# Patient Record
Sex: FEMALE | Race: WHITE
[De-identification: names, ages, dates, MRNs, and addresses within clinical notes are randomized per-mention and may not be internally consistent; named-entity substitution may affect disease eponyms.]

---

## 2018-11-17 ENCOUNTER — HOSPITAL ENCOUNTER (OUTPATIENT)
Dept: HOSPITAL 47 - RADPETMAIN | Age: 73
Discharge: HOME | End: 2018-11-17
Attending: NURSE PRACTITIONER
Payer: MEDICARE

## 2018-11-17 DIAGNOSIS — C43.59: Primary | ICD-10-CM

## 2018-11-17 DIAGNOSIS — Z98.890: ICD-10-CM

## 2018-11-17 PROCEDURE — 78816 PET IMAGE W/CT FULL BODY: CPT

## 2018-11-19 NOTE — PE
Nuclear medicine PET/CT

 

HISTORY: Melanoma, initial

 

Patient received 13.1 mCi F-18 FDG intravenously in delayed scanning was performed through the whole 
body.

 

No comparisons

 

Neck and Chest: There is no suspicious hypermetabolic uptake. No evident adenopathy. No evident lung 
mass. No pleural or pericardial effusion.

 

ABDOMEN: No evident liver mass or retroperitoneal adenopathy. Prominent extrarenal pelvis noted in th
e right kidney, there may be UPJ obstruction. No suspicious hypermetabolic uptake. Uterus and adnexal
 structures are not seen.

 

Osseous structures are are unremarkable, no suspicious hypermetabolic uptake. Sclerotic focus in the 
left sacral ala likely represents bone island. Hypertrophic change present at the sacroiliac joints, 
facet joints of the lower lumbar spine.

 

Lower extremities are remarkable for knee prostheses, no suspicious hypermetabolic uptake to suggest 
metastatic disease.

 

IMPRESSION: No suspicious hypermetabolic uptake is evident to suggest metastatic disease. Postop ocampo
ges.

## 2019-04-12 ENCOUNTER — HOSPITAL ENCOUNTER (OUTPATIENT)
Dept: HOSPITAL 47 - RADPETMAIN | Age: 74
End: 2019-04-12
Attending: INTERNAL MEDICINE
Payer: MEDICARE

## 2019-04-12 DIAGNOSIS — C43.59: Primary | ICD-10-CM

## 2019-04-12 PROCEDURE — 78816 PET IMAGE W/CT FULL BODY: CPT

## 2019-04-15 NOTE — PE
EXAMINATION TYPE: PET CT fusion whole body

 

DATE OF EXAM: 4/12/2019

 

COMPARISON: Prior PET/CT November 17, 2018

 

HISTORY: Melanoma with masses removed from arms and back, 4 prior surgeries most recent April 1, 2019
.   

 

TECHNIQUE:  Following the intravenous administration of 15.2 x 4 mCi of F-18 FDG, whole body images a
re performed from the top of skull to the bottom of feet.  Images are reviewed on the computer in the
 coronal, axial, and sagittal planes.  Reconstructed rotating images are created on independent works
tation and reviewed on the computer.   A noncontrast CT is performed in conjunction with the PET scan
. PET/CT imaging of bilateral lower extremities also performed.

 

SCAN: Subsequent Scan

 

FINDINGS: 

 

HEAD AND NECK:  No suspicious hypermetabolic uptake or masses in the scalp. No suspicious hypermetabo
lic masses or adenopathy in the neck.

 

CHEST, MEDIASTINUM, AND HILAR REGION: In the posterior subcutaneous fat lateral to the right scapula 
tip there is new 9 mm hypermetabolic nodule axial image 120, max SUV is 3.71. There is additional new
 suspicious soft tissue nodule posterior right upper thorax axial image 90 measuring 11 x 6 mm that i
s ametabolic.

 

ABDOMEN AND PELVIS: Normal excretion and collecting systems and bladder is present. No suspicious new
 hypermetabolic adenopathy or soft tissue nodules are identified.

 

OSSEOUS STRUCTURES: No new areas of abnormal hypermetabolic uptake.

 

LOWER EXTREMITIES: Metallic artifact bilateral knee arthroplasty is redemonstrated with areas of asym
metric metallic uptake surrounding left knee prosthesis similar to prior. There is linear uptake invo
lving anterior lateral left leg muscle on current study presumed postinflammatory. No suspicious hype
rmetabolic subcutaneous nodules are identified.

 

OTHER CT: Mild cardiomegaly with coronary artery calcification is redemonstrated. There is new right 
internal jugular Mediport catheter terminating in SVC.

 

There is suspected 1.1 cm rim calcified splenic artery aneurysm axial image 149 redemonstrated. Small
 hiatal hernia is again seen.

 

Mild/moderate calcified plaque of aorta extends into branch vessels. There is persistent moderate tanisha
localiectasis without hydroureter suggesting right UPJ stricture or stenosis. Scattered left-sided pe
lvic phleboliths are redemonstrated. Uterus is surgically absent or markedly atrophic.

 

There is multilevel spurring in the spine. There is facet arthropathy in lower lumbar levels.

 

IMPRESSION: Single new hypermetabolic lesion identified lateral aspect right mid thorax. Also suspici
ous ametabolic new subcutaneous lesion posterior right upper thorax.

## 2019-08-17 ENCOUNTER — HOSPITAL ENCOUNTER (OUTPATIENT)
Dept: HOSPITAL 47 - RADPETMAIN | Age: 74
Discharge: HOME | End: 2019-08-17
Attending: INTERNAL MEDICINE
Payer: MEDICARE

## 2019-08-17 DIAGNOSIS — C43.59: Primary | ICD-10-CM

## 2019-08-17 PROCEDURE — 78816 PET IMAGE W/CT FULL BODY: CPT

## 2019-08-19 NOTE — PE
Nuclear medicine PET/CT

 

HISTORY: Malignant melanoma of other part trauma, C 43.59, subsequent

 

Patient received 10 mCi F-18 FDG intravenously and delayed whole-body scanning was performed.

 

Correlation to prior PET/CT 4/12/2019

 

Head and neck: Unremarkable, stable, no evident adenopathy.

 

CHEST: The Port-A-Cath in the right pectoral region shows a loop in the internal jugular vein, distal
 tip is likely near the confluence with the subclavian vein. There is a soft tissue mass within the s
ubcutaneous soft tissues at the level of the third posterior rib measuring approximately 25 mm and th
ere is associated hypermetabolic uptake, SUV is 6.8. This has progressed in size and activity in the 
interval compared to prior exam. The lesion lateral to the scapular tip as noted on prior exam is not
ed as on prior exam and is somewhat less well-defined, may be increased in size slightly measuring ap
proximately 1.8 cm as compared to prior when it measured 9 mm, SUV is 3.1. There is an additional foc
us immediately adjacent extends towards the skin of the back without definite associated hypermetabol
ic uptake. There is a lung nodule within the lingula measuring approximately 1 cm in AP dimension, pr
evious exam lesion measured only 4 mm at this level. There is an additional lung nodule present in th
e right lung on axial image #116 measuring approximately 9 mm, no associated hypermetabolic uptake. L
eft upper lobe lung nodule measuring 1 cm shows no associated hypermetabolic uptake and was not seen 
on prior exam. Similarly, left upper lobe lung nodule on axial image 107 measures 13 mm and was not s
een on previous as well as a pleural-based nodule on axial image 111, new left upper lobe lung nodule
 axial image 85 measures 6 mm at the apex, right upper lobe nodule medially measures 6 mm on axial an
d 97, additional subpleural nodule at the level of the mediastinum is new and measures 7 to 8 mm. Rig
ht lower lobe nodule axial image 109 is subcentimeter in size,, right lower lobe nodule on axial imag
e 124 measures 9 mm, there may be additional smaller nodules lung nodules do not show hypermetabolic 
uptake.

 

There is some uptake associated with the patient's thyroid gland on the left similar to prior exam, S
UV is.

 

ABDOMEN: There is a focus of decreased uptake present within the posterior spleen with some periphera
l hypermetabolic uptake which was not seen on prior exam. Lesion measures approximately 4.3 cm in siz
e, SUV 3.5. Questionable focus of liver uptake inferiorly adjacent to the gallbladder on axial image 
168, SUV 3.4.

 

Lower extremities are stable in appearance.

 

Osseous structures are stable, no suspicious hypermetabolic uptake.

 

IMPRESSION: Progression of patient's metastatic disease is suspected. Indeterminate abnormal focus wi
thin the spleen as described. Additional findings above.

## 2019-11-04 ENCOUNTER — HOSPITAL ENCOUNTER (INPATIENT)
Dept: HOSPITAL 47 - EC | Age: 74
LOS: 3 days | Discharge: HOME HEALTH | DRG: 300 | End: 2019-11-07
Attending: HOSPITALIST | Admitting: HOSPITALIST
Payer: MEDICARE

## 2019-11-04 DIAGNOSIS — C79.71: ICD-10-CM

## 2019-11-04 DIAGNOSIS — C78.89: ICD-10-CM

## 2019-11-04 DIAGNOSIS — Z79.1: ICD-10-CM

## 2019-11-04 DIAGNOSIS — Z79.899: ICD-10-CM

## 2019-11-04 DIAGNOSIS — Z88.0: ICD-10-CM

## 2019-11-04 DIAGNOSIS — C78.01: ICD-10-CM

## 2019-11-04 DIAGNOSIS — D69.59: ICD-10-CM

## 2019-11-04 DIAGNOSIS — Z90.710: ICD-10-CM

## 2019-11-04 DIAGNOSIS — R06.89: ICD-10-CM

## 2019-11-04 DIAGNOSIS — C78.7: ICD-10-CM

## 2019-11-04 DIAGNOSIS — I82.4Z2: ICD-10-CM

## 2019-11-04 DIAGNOSIS — Z79.52: ICD-10-CM

## 2019-11-04 DIAGNOSIS — E78.5: ICD-10-CM

## 2019-11-04 DIAGNOSIS — D72.829: ICD-10-CM

## 2019-11-04 DIAGNOSIS — C43.9: ICD-10-CM

## 2019-11-04 DIAGNOSIS — I10: ICD-10-CM

## 2019-11-04 DIAGNOSIS — C78.02: ICD-10-CM

## 2019-11-04 DIAGNOSIS — C79.72: ICD-10-CM

## 2019-11-04 DIAGNOSIS — Z88.1: ICD-10-CM

## 2019-11-04 DIAGNOSIS — J70.4: ICD-10-CM

## 2019-11-04 DIAGNOSIS — T45.1X5A: ICD-10-CM

## 2019-11-04 DIAGNOSIS — I82.432: Primary | ICD-10-CM

## 2019-11-04 DIAGNOSIS — K59.00: ICD-10-CM

## 2019-11-04 LAB
ALBUMIN SERPL-MCNC: 3.4 G/DL (ref 3.5–5)
ALP SERPL-CCNC: 162 U/L (ref 38–126)
ALT SERPL-CCNC: 133 U/L (ref 9–52)
ANION GAP SERPL CALC-SCNC: 9 MMOL/L
APTT BLD: 84.3 SEC (ref 22–30)
AST SERPL-CCNC: 275 U/L (ref 14–36)
BASOPHILS # BLD AUTO: 0 K/UL (ref 0–0.2)
BASOPHILS NFR BLD AUTO: 0 %
BUN SERPL-SCNC: 37 MG/DL (ref 7–17)
CALCIUM SPEC-MCNC: 8.8 MG/DL (ref 8.4–10.2)
CHLORIDE SERPL-SCNC: 105 MMOL/L (ref 98–107)
CO2 SERPL-SCNC: 22 MMOL/L (ref 22–30)
EOSINOPHIL # BLD AUTO: 0 K/UL (ref 0–0.7)
EOSINOPHIL NFR BLD AUTO: 0 %
ERYTHROCYTE [DISTWIDTH] IN BLOOD BY AUTOMATED COUNT: 5.56 M/UL (ref 3.8–5.4)
ERYTHROCYTE [DISTWIDTH] IN BLOOD: 13.5 % (ref 11.5–15.5)
GLUCOSE SERPL-MCNC: 102 MG/DL (ref 74–99)
HCT VFR BLD AUTO: 48.5 % (ref 34–46)
HGB BLD-MCNC: 15.8 GM/DL (ref 11.4–16)
INR PPP: 1 (ref ?–1.2)
LYMPHOCYTES # SPEC AUTO: 5.6 K/UL (ref 1–4.8)
LYMPHOCYTES NFR SPEC AUTO: 38 %
MCH RBC QN AUTO: 28.4 PG (ref 25–35)
MCHC RBC AUTO-ENTMCNC: 32.6 G/DL (ref 31–37)
MCV RBC AUTO: 87.2 FL (ref 80–100)
MONOCYTES # BLD AUTO: 0.7 K/UL (ref 0–1)
MONOCYTES NFR BLD AUTO: 4 %
NEUTROPHILS # BLD AUTO: 8.4 K/UL (ref 1.3–7.7)
NEUTROPHILS NFR BLD AUTO: 56 %
PLATELET # BLD AUTO: 103 K/UL (ref 150–450)
POTASSIUM SERPL-SCNC: 4.6 MMOL/L (ref 3.5–5.1)
PROT SERPL-MCNC: 6.6 G/DL (ref 6.3–8.2)
PT BLD: 11.1 SEC (ref 9–12)
SODIUM SERPL-SCNC: 136 MMOL/L (ref 137–145)
WBC # BLD AUTO: 14.9 K/UL (ref 3.8–10.6)

## 2019-11-04 PROCEDURE — 83880 ASSAY OF NATRIURETIC PEPTIDE: CPT

## 2019-11-04 PROCEDURE — 87040 BLOOD CULTURE FOR BACTERIA: CPT

## 2019-11-04 PROCEDURE — 87502 INFLUENZA DNA AMP PROBE: CPT

## 2019-11-04 PROCEDURE — 71275 CT ANGIOGRAPHY CHEST: CPT

## 2019-11-04 PROCEDURE — 84484 ASSAY OF TROPONIN QUANT: CPT

## 2019-11-04 PROCEDURE — 96374 THER/PROPH/DIAG INJ IV PUSH: CPT

## 2019-11-04 PROCEDURE — 94640 AIRWAY INHALATION TREATMENT: CPT

## 2019-11-04 PROCEDURE — 80053 COMPREHEN METABOLIC PANEL: CPT

## 2019-11-04 PROCEDURE — 93005 ELECTROCARDIOGRAM TRACING: CPT

## 2019-11-04 PROCEDURE — 99285 EMERGENCY DEPT VISIT HI MDM: CPT

## 2019-11-04 PROCEDURE — 36415 COLL VENOUS BLD VENIPUNCTURE: CPT

## 2019-11-04 PROCEDURE — 85610 PROTHROMBIN TIME: CPT

## 2019-11-04 PROCEDURE — 85025 COMPLETE CBC W/AUTO DIFF WBC: CPT

## 2019-11-04 PROCEDURE — 96375 TX/PRO/DX INJ NEW DRUG ADDON: CPT

## 2019-11-04 PROCEDURE — 93306 TTE W/DOPPLER COMPLETE: CPT

## 2019-11-04 PROCEDURE — 85730 THROMBOPLASTIN TIME PARTIAL: CPT

## 2019-11-04 RX ADMIN — Medication SCH MG: at 22:44

## 2019-11-04 RX ADMIN — ENOXAPARIN SODIUM SCH MG: 80 INJECTION SUBCUTANEOUS at 22:45

## 2019-11-04 NOTE — P.HPIM
History of Present Illness


H&P Date: 11/04/19





Patient is a 74-year-old female with a PMH of metastatic melanoma diagnosed ~18 

months ago s/p Opdivo for 11 months, currently on Mekinist and Tefinlar for the 

past 7 days, sent to the ED form Dr Arellano's office for SOB. The patient had 

presented to his office earlier today for her routine appointment where Mirta 

noted that the patient appeared more SOB. Patient endorsed to her that she has 

been getting tired and short of breath quickly since she began her new 

chemotherapy regimen. There was also concerns regarding the swelling and warmth 

of her LLE and the patient was subsequently sent to the ED. Upon arrival, the 

patient's vital signs were P 102, /93, T 98.1, and SpO2 96% on RA. The p

atient reported that along with the decreased exercise tolerance with shortness 

of breath, she has also had a persistent non-productive cough for the past few 

weeks to months. She also reports a single episode of fever at home w/ Tmax 101,

two days ago. She also notes occasional nausea with dry heaving. The patient 

reports no changes in her legs and states that since her knee surgeries, she has

always had some LE swelling and hadn't noticed anything unusual. She denied leg 

pain, recent travel. She also denied chest pain, dizziness, palpitations. She 

denied vomiting, diarrhea, or abdominal pain. She underwent an extensive 

evaluation in the ED w/ venous doppler of the LLE positive for DVT from 

popliteal vein to the proximal calf veins. Chest CTA revealed extensive 

progressive metastatic disease involving the lungs, spleen, and liver without 

any evidence of PE. EKG revealed sinus tachycardia @ 105 bpm w/ TWI and q-wave 

in lead III. Laboratory evaluation revealed WBC count of 14.9, platelets of 103,

, , alk phos 162, sodium 136, potassium 4.6, BUN 37, and 

creatinine 0.68.  Patient was admitted for further management.





Review of Systems





Pertinent positives and negatives as discussed in HPI, a complete review of 

systems was performed and all other systems are negative.





Past Medical History


Past Medical History: Cancer, Hyperlipidemia, Hypertension, Pneumonia


History of Any Multi-Drug Resistant Organisms: None Reported


Past Surgical History: Bladder Surgery, Hernia Repair, Hysterectomy, Orthopedic 

Surgery


Additional Past Surgical History / Comment(s): tumor removal ight breast


Past Psychological History: No Psychological Hx Reported


Smoking Status: Never smoker


Past Alcohol Use History: None Reported


Past Drug Use History: None Reported





Medications and Allergies


                                Home Medications











 Medication  Instructions  Recorded  Confirmed  Type


 


Meloxicam [Mobic] 7.5 mg PO BID 11/04/19 11/04/19 History


 


Nortriptyline [Pamelor] 10 mg PO DAILY 11/04/19 11/04/19 History


 


Nystatin-Triamcinolone Oint 1 applic TOPICAL BID PRN 11/04/19 11/04/19 History





[Mycolog 100,000-0.1 Unit/gm-%    





Oint]    


 


Omeprazole 20 mg PO DAILY 11/04/19 11/04/19 History


 


Prochlorperazine [Compazine] 10 mg PO Q6H PRN 11/04/19 11/04/19 History


 


Sertraline [Zoloft] 100 mg PO BID 11/04/19 11/04/19 History


 


Tafinlar 75mg Capsule 150 mg PO Q12H 11/04/19 11/04/19 History


 


Trametinib Dimethyl Sulfoxide 2 mg PO DAILY 11/04/19 11/04/19 History





[Mekinist]    


 


amLODIPine BESYLATE [Norvasc] 2.5 mg PO DAILY 11/04/19 11/04/19 History








                                    Allergies











Allergy/AdvReac Type Severity Reaction Status Date / Time


 


ciprofloxacin [From Cipro] Allergy  Hallucinati Verified 11/04/19 20:32





   ons  


 


Penicillins Allergy  Unknown Verified 11/04/19 20:32





   Childhood  














Physical Exam


Vitals: 


                                   Vital Signs











  Temp Pulse Resp BP Pulse Ox


 


 11/04/19 20:00    30 H  131/93  92 L


 


 11/04/19 19:30   109 H  23  118/84  95


 


 11/04/19 19:23   100  18  


 


 11/04/19 19:20   105 H  14   99


 


 11/04/19 19:15   101 H  16  


 


 11/04/19 17:50  98.1 F  102 H  19  145/93  96








                                Intake and Output











 11/04/19 11/04/19 11/04/19





 06:59 14:59 22:59


 


Other:   


 


  Weight   75.296 kg














General: non toxic, no distress, appears at stated age


Derm: no unusual rashes/lesions no unusual ecchymoses, warm, dry


Head: atraumatic, normocephalic, symmetric


Eyes: EOMI, no lid lag, anicteric sclera, pupils equal round reactive to light


ENT: Nose and ears atraumatic, no thrush,  no pharyngeal erythema


Neck: No thyromegaly, no cervical lymphadenopathy, trachea midline, supple


Mouth: no lip lesion, mucus membranes moist


Cardiovascular: S1S2 reg, tachycardic, no murmur, positive posterior tibial 

pulse bilateral, capillary refill less than 2 seconds


Lungs: CTA bilateral, no rhonchi, no rales , no accessory muscle use


Abdominal: soft,  nontender to palpation, no guarding, no appreciable 

organomegaly, normal bowel sounds


Ext: Left lower extremity mild swelling and warmth, no gross muscle atrophy,  

muscle strength 5 out of 5 in all 4 extremities grossly, no contractures


Neuro:  CN II-XI grossly intact, light touch intact all 4 extremities, finger to

nose within normal limits,


Psych: Alert, oriented, appropriate affect 





Results


CBC & Chem 7: 


                                 11/04/19 18:09





                                 11/04/19 18:09


Labs: 


                  Abnormal Lab Results - Last 24 Hours (Table)











  11/04/19 11/04/19 11/04/19 Range/Units





  18:09 18:09 18:09 


 


WBC  14.9 H    (3.8-10.6)  k/uL


 


RBC  5.56 H    (3.80-5.40)  m/uL


 


Hct  48.5 H    (34.0-46.0)  %


 


Plt Count  103 L    (150-450)  k/uL


 


Neutrophils #  8.4 H    (1.3-7.7)  k/uL


 


Lymphocytes #  5.6 H    (1.0-4.8)  k/uL


 


APTT    84.3 H  (22.0-30.0)  sec


 


Sodium   136 L   (137-145)  mmol/L


 


BUN   37 H   (7-17)  mg/dL


 


Glucose   102 H   (74-99)  mg/dL


 


AST   275 H   (14-36)  U/L


 


ALT   133 H   (9-52)  U/L


 


Alkaline Phosphatase   162 H   ()  U/L


 


Albumin   3.4 L   (3.5-5.0)  g/dL














Assessment and Plan


Plan: 





Acute LLE DVT in setting of malignancy


-Start patient on Lovenox


-Heme/Onc consulted


-Monitor CBC





Shortness of breath, possibly due to extensive metastatic melanoma disease 

burden


-Dr Arellano consulted


-Monitor for now


-Supplemental oxygen


-Echocardiogram ordered





Thrombocytopenia


-Likely due to chemotherapy


-Monitor for now





Leukocytosis


-Likely due to underlying malignancy


-Monitor for signs of infection





Abnormal LFTs


-Possibly secondary to metastatic disease of the liver


-Monitor for now





Chronic conditions: HTN, HLD


-C/w home meds





DVT prophylaxis


-Lovenox





The patient is admitted with an anticipated greater than 2 midnight stay for 

evaluation of acute DVT


CODE STATUS: Full Code


Discussed with: Patient


Anticipated discharge date: 2-3 days


Anticipated discharge place: Home


A total of 40 minutes was spent on the care of this complex patient more than 

50% of the time was spent in counseling and care coordination.

## 2019-11-04 NOTE — ED
SOB HPI





- General


Chief Complaint: Shortness of Breath


Stated Complaint: blood clot


Time Seen by Provider: 11/04/19 17:56


Source: patient


Mode of arrival: wheelchair


Limitations: no limitations





- History of Present Illness


Initial Comments: 





The patient is a 74-year-old female with past history of metastatic melanoma who

presents to the emergency department with reported shortness of breath.  She 

states it's been going on for the past 6 days.  Reports to mild sputum 

production and low-grade fevers.  She did see Dr. Arellano's NP Mirta in office 

today at 1:30 pm.  She did send the patient for a lower extremity Doppler and a 

CT of her chest.  Patient had imaging performed and was called with results.  

She was told to proceed immediately to the emergency room.  She was told that 

she does have a lower extremity DVT.  No history of similar in the past.  Denies

calf pain or swelling.  Does admit to chest palpitations.  She is not currently 

on anticoagulation.  She is on oral chemotherapy daily at home.  Denies any 

chest pain.  No history of cardiac disease.  Denies any headaches or visual 

changes.  No known metastatic disease to the brain.  No ripping or tearing 

sensation to her back.  Does admit to chronic nausea and some vomiting.  Denies 

any changes in her bowel or bladder habits.  No history of life-threatening 

bleed.  There are no other alleviating, precipitating or modifying factors





- Related Data


                                Home Medications











 Medication  Instructions  Recorded  Confirmed


 


Meloxicam [Mobic] 7.5 mg PO BID 11/04/19 11/04/19


 


Nortriptyline [Pamelor] 10 mg PO HS 11/04/19 11/04/19


 


Nystatin-Triamcinolone Oint 1 applic TOPICAL BID PRN 11/04/19 11/04/19





[Mycolog 100,000-0.1 Unit/gm-%   





Oint]   


 


Omeprazole 20 mg PO DAILY 11/04/19 11/04/19


 


Prochlorperazine [Compazine] 10 mg PO Q6H PRN 11/04/19 11/04/19


 


Sertraline [Zoloft] 100 mg PO BID 11/04/19 11/04/19


 


Tafinlar 75mg Capsule 150 mg PO Q12H 11/04/19 11/04/19


 


Trametinib Dimethyl Sulfoxide 2 mg PO DAILY 11/04/19 11/04/19





[Mekinist]   


 


amLODIPine BESYLATE [Norvasc] 2.5 mg PO DAILY 11/04/19 11/04/19








                                  Previous Rx's











 Medication  Instructions  Recorded


 


Enoxaparin [Lovenox] 75 mg SQ Q12H 30 Days #60 syringe 11/07/19


 


HYDROcodone/APAP 5-325MG [Norco 1 each PO Q4HR PRN 3 Days #12 tab 11/07/19





5-325]  


 


Sennosides [Senokot] 8.6 mg PO BID 30 Days #60 tab 11/07/19


 


predniSONE 40 mg PO AS DIRECTED 15 Days #30 11/07/19





 tab 











                                    Allergies











Allergy/AdvReac Type Severity Reaction Status Date / Time


 


ciprofloxacin [From Cipro] Allergy  Hallucinati Verified 11/04/19 20:32





   ons  


 


Penicillins Allergy  Unknown Verified 11/04/19 20:32





   Childhood  














Review of Systems


ROS Statement: 


Those systems with pertinent positive or pertinent negative responses have been 

documented in the HPI.





ROS Other: All systems not noted in ROS Statement are negative.





Past Medical History


Past Medical History: Cancer, Hyperlipidemia, Hypertension, Pneumonia


History of Any Multi-Drug Resistant Organisms: None Reported


Past Surgical History: Bladder Surgery, Hernia Repair, Hysterectomy, Orthopedic 

Surgery


Additional Past Surgical History / Comment(s): tumor removal ight breast


Past Psychological History: No Psychological Hx Reported


Smoking Status: Never smoker


Past Alcohol Use History: None Reported


Past Drug Use History: None Reported





General Exam


Limitations: no limitations


General appearance: alert, in no apparent distress


Head exam: Present: atraumatic, normocephalic


Eye exam: Present: PERRL, EOMI


Pupils: Present: normal accommodation


ENT exam: Present: normal exam, normal oropharynx


Neck exam: Absent: tenderness, meningismus


Respiratory exam: Present: accessory muscle use, other (Conversational dyspnea, 

tachypnea).  Absent: wheezes, rales, rhonchi, stridor


Cardiovascular Exam: Present: normal rhythm, tachycardia


GI/Abdominal exam: Present: soft.  Absent: distended, tenderness, guarding, 

rebound, rigid


Extremities exam: Present: normal capillary refill, pedal edema, calf tenderness


Neurological exam: Present: alert, oriented X3


Psychiatric exam: Present: normal affect, normal mood


Skin exam: Present: warm, dry, intact





Course


                                   Vital Signs











  11/04/19 11/04/19 11/04/19





  17:50 19:15 19:20


 


Temperature 98.1 F  


 


Pulse Rate 102 H 101 H 105 H


 


Pulse Rate [   





Pulse Oximetery   





]   


 


Respiratory 19 16 14





Rate   


 


Blood Pressure 145/93  


 


Blood Pressure   





[Right Arm]   


 


O2 Sat by Pulse 96  99





Oximetry   














  11/04/19 11/04/19 11/04/19





  19:23 19:30 20:00


 


Temperature   


 


Pulse Rate 100 109 H 


 


Pulse Rate [   





Pulse Oximetery   





]   


 


Respiratory 18 23 30 H





Rate   


 


Blood Pressure  118/84 131/93


 


Blood Pressure   





[Right Arm]   


 


O2 Sat by Pulse  95 92 L





Oximetry   














  11/04/19





  21:30


 


Temperature 97.8 F


 


Pulse Rate 


 


Pulse Rate [ 104 H





Pulse Oximetery 





] 


 


Respiratory 18





Rate 


 


Blood Pressure 


 


Blood Pressure 141/87





[Right Arm] 


 


O2 Sat by Pulse 97





Oximetry 














Medical Decision Making





- Medical Decision Making


Upon arrival the patient was placed into room 5.  A thorough history and 

physical exam was performed.  I did perform a 12-lead EKG which demonstrated a 

sinus tachycardia.  I reviewed the patient's imaging reports.  Venous Doppler 

demonstrates positive DVT in the left leg from the popliteal vein to proximal 

calf veins.  CTA of the chest demonstrates interval development of bilateral 

consolidations and tiny left effusion with subtle groundglass changes.  No diagn

ostic evidence of pulmonary embolism.  Marked interval progression in the size 

number of pulmonary nodules noted diffusely within bilateral lung fields.  

Splenomegaly with increase in size of largest lesion measuring 5.8 cm.  

Subcutaneous and hepatic metastasis.  I did recommend laboratory studies.  White

blood cell count is 14.9.  Hemoglobin 15.8.  Platelets 103.  PTT is originally 

measured at 84.3.  Repeat is 23.8.  Blood work was drawn off of the patient's 

port.  INR is 1.  Elevation in her AST of 275, .  Troponin is negative.  

Influenza A and B are negative I called and discussed this with the patient's 

oncologist, Dr. Arellano.  He requested that the patient be heparinized.  She does 

not have any contraindications therefore do order a heparin bolus and drip.  He 

instructed me to hold off on the patient's oral chemotherapy.  He wants the 

patient placed on IV steroids and covered with IV antibiotics.  He is given 125 

mg of Solu-Medrol, 1 g of Rocephin and 500 mg of azithromycin.  Dr. Arellano refused 

hospital admission and requested to be placed on consult.  I called and 

discussed this with Dr. Mac who accepted admission.  Bridging orders were 

placed and the patient went to the floor in stable condition





- Lab Data


Result diagrams: 


                                 11/07/19 05:30





                                 11/05/19 05:34


                                   Lab Results











  11/04/19 11/04/19 11/04/19 Range/Units





  18:09 18:09 18:09 


 


WBC  14.9 H    (3.8-10.6)  k/uL


 


RBC  5.56 H    (3.80-5.40)  m/uL


 


Hgb  15.8    (11.4-16.0)  gm/dL


 


Hct  48.5 H    (34.0-46.0)  %


 


MCV  87.2    (80.0-100.0)  fL


 


MCH  28.4    (25.0-35.0)  pg


 


MCHC  32.6    (31.0-37.0)  g/dL


 


RDW  13.5    (11.5-15.5)  %


 


Plt Count  103 L    (150-450)  k/uL


 


Neutrophils %  56    %


 


Lymphocytes %  38    %


 


Monocytes %  4    %


 


Eosinophils %  0    %


 


Basophils %  0    %


 


Neutrophils #  8.4 H    (1.3-7.7)  k/uL


 


Lymphocytes #  5.6 H    (1.0-4.8)  k/uL


 


Monocytes #  0.7    (0-1.0)  k/uL


 


Eosinophils #  0.0    (0-0.7)  k/uL


 


Basophils #  0.0    (0-0.2)  k/uL


 


PT     (9.0-12.0)  sec


 


INR     (<1.2)  


 


APTT     (22.0-30.0)  sec


 


Sodium   136 L   (137-145)  mmol/L


 


Potassium   4.6   (3.5-5.1)  mmol/L


 


Chloride   105   ()  mmol/L


 


Carbon Dioxide   22   (22-30)  mmol/L


 


Anion Gap   9   mmol/L


 


BUN   37 H   (7-17)  mg/dL


 


Creatinine   0.68   (0.52-1.04)  mg/dL


 


Est GFR (CKD-EPI)AfAm   >90   (>60 ml/min/1.73 sqM)  


 


Est GFR (CKD-EPI)NonAf   88   (>60 ml/min/1.73 sqM)  


 


Glucose   102 H   (74-99)  mg/dL


 


Calcium   8.8   (8.4-10.2)  mg/dL


 


Total Bilirubin   0.8   (0.2-1.3)  mg/dL


 


AST   275 H   (14-36)  U/L


 


ALT   133 H   (9-52)  U/L


 


Alkaline Phosphatase   162 H   ()  U/L


 


Troponin I     (0.000-0.034)  ng/mL


 


NT-Pro-B Natriuret Pep    139  pg/mL


 


Total Protein   6.6   (6.3-8.2)  g/dL


 


Albumin   3.4 L   (3.5-5.0)  g/dL


 


Influenza Type A RNA     (Not Detectd)  


 


Influenza Type B (PCR)     (Not Detectd)  














  11/04/19 11/04/19 11/04/19 Range/Units





  18:09 18:09 19:45 


 


WBC     (3.8-10.6)  k/uL


 


RBC     (3.80-5.40)  m/uL


 


Hgb     (11.4-16.0)  gm/dL


 


Hct     (34.0-46.0)  %


 


MCV     (80.0-100.0)  fL


 


MCH     (25.0-35.0)  pg


 


MCHC     (31.0-37.0)  g/dL


 


RDW     (11.5-15.5)  %


 


Plt Count     (150-450)  k/uL


 


Neutrophils %     %


 


Lymphocytes %     %


 


Monocytes %     %


 


Eosinophils %     %


 


Basophils %     %


 


Neutrophils #     (1.3-7.7)  k/uL


 


Lymphocytes #     (1.0-4.8)  k/uL


 


Monocytes #     (0-1.0)  k/uL


 


Eosinophils #     (0-0.7)  k/uL


 


Basophils #     (0-0.2)  k/uL


 


PT  11.1    (9.0-12.0)  sec


 


INR  1.0    (<1.2)  


 


APTT  84.3 H    (22.0-30.0)  sec


 


Sodium     (137-145)  mmol/L


 


Potassium     (3.5-5.1)  mmol/L


 


Chloride     ()  mmol/L


 


Carbon Dioxide     (22-30)  mmol/L


 


Anion Gap     mmol/L


 


BUN     (7-17)  mg/dL


 


Creatinine     (0.52-1.04)  mg/dL


 


Est GFR (CKD-EPI)AfAm     (>60 ml/min/1.73 sqM)  


 


Est GFR (CKD-EPI)NonAf     (>60 ml/min/1.73 sqM)  


 


Glucose     (74-99)  mg/dL


 


Calcium     (8.4-10.2)  mg/dL


 


Total Bilirubin     (0.2-1.3)  mg/dL


 


AST     (14-36)  U/L


 


ALT     (9-52)  U/L


 


Alkaline Phosphatase     ()  U/L


 


Troponin I   <0.012   (0.000-0.034)  ng/mL


 


NT-Pro-B Natriuret Pep     pg/mL


 


Total Protein     (6.3-8.2)  g/dL


 


Albumin     (3.5-5.0)  g/dL


 


Influenza Type A RNA    Not Detected  (Not Detectd)  


 


Influenza Type B (PCR)    Not Detected  (Not Detectd)  














  11/04/19 Range/Units





  19:45 


 


WBC   (3.8-10.6)  k/uL


 


RBC   (3.80-5.40)  m/uL


 


Hgb   (11.4-16.0)  gm/dL


 


Hct   (34.0-46.0)  %


 


MCV   (80.0-100.0)  fL


 


MCH   (25.0-35.0)  pg


 


MCHC   (31.0-37.0)  g/dL


 


RDW   (11.5-15.5)  %


 


Plt Count   (150-450)  k/uL


 


Neutrophils %   %


 


Lymphocytes %   %


 


Monocytes %   %


 


Eosinophils %   %


 


Basophils %   %


 


Neutrophils #   (1.3-7.7)  k/uL


 


Lymphocytes #   (1.0-4.8)  k/uL


 


Monocytes #   (0-1.0)  k/uL


 


Eosinophils #   (0-0.7)  k/uL


 


Basophils #   (0-0.2)  k/uL


 


PT   (9.0-12.0)  sec


 


INR   (<1.2)  


 


APTT  23.8  (22.0-30.0)  sec


 


Sodium   (137-145)  mmol/L


 


Potassium   (3.5-5.1)  mmol/L


 


Chloride   ()  mmol/L


 


Carbon Dioxide   (22-30)  mmol/L


 


Anion Gap   mmol/L


 


BUN   (7-17)  mg/dL


 


Creatinine   (0.52-1.04)  mg/dL


 


Est GFR (CKD-EPI)AfAm   (>60 ml/min/1.73 sqM)  


 


Est GFR (CKD-EPI)NonAf   (>60 ml/min/1.73 sqM)  


 


Glucose   (74-99)  mg/dL


 


Calcium   (8.4-10.2)  mg/dL


 


Total Bilirubin   (0.2-1.3)  mg/dL


 


AST   (14-36)  U/L


 


ALT   (9-52)  U/L


 


Alkaline Phosphatase   ()  U/L


 


Troponin I   (0.000-0.034)  ng/mL


 


NT-Pro-B Natriuret Pep   pg/mL


 


Total Protein   (6.3-8.2)  g/dL


 


Albumin   (3.5-5.0)  g/dL


 


Influenza Type A RNA   (Not Detectd)  


 


Influenza Type B (PCR)   (Not Detectd)  














- EKG Data


EKG Comments: 





EKG demonstrates a sinus tachycardia with a ventricular rate of 105.  IN 

interval 152.  QRS 82.  .  No acute ST segment elevations or depressions 

concerning for ischemic changes.  There is Q-wave in lead 3.





Critical Care Time


Critical Care Time: Yes


Total Critical Care Time: 35 (mins)


Critical Care Time: 


Critical care time for heparin infusion for acute DVT





Disposition


Clinical Impression: 


 Acute DVT (deep venous thrombosis), Metastatic melanoma, Respiratory 

insufficiency





Disposition: ADMITTED AS IP TO THIS HOSP


Condition: Stable


Is patient prescribed a controlled substance at d/c from ED?: No


Decision to Admit Reason: Admit from EC


Decision Date: 11/04/19


Decision Time: 20:04

## 2019-11-04 NOTE — US
EXAMINATION TYPE: US venous doppler duplex LE LT

 

DATE OF EXAM: 11/4/2019 4:10 PM

 

COMPARISON: NONE

 

CLINICAL HISTORY: 74-year-old female R06.02 SOB; C43.59 Melanoma  R22.42 swelling. SOB.  Leg swelling
.  Patient states starting a new chemo drug that can cause blood clots.  

 

SIDE PERFORMED: Left  

 

TECHNIQUE:  The lower extremity deep venous system is examined utilizing real time linear array sonog
donn with graded compression, doppler sonography and color-flow sonography.

 

FINDINGS:

 

VESSELS IMAGED:

External Iliac Vein (EIV)

Common Femoral Vein

Deep Femoral Vein

Greater Saphenous Vein *

Femoral Vein

Popliteal Vein

Small Saphenous Vein *

Proximal Calf Veins

(* superficial vessels)

 

 

 

Left Leg:  Appears POSITIVE for DVT from Popliteal vein to Proximal calf veins.  No vascular flow.  V
isible internal echoes.  

 

 

 

IMPRESSION:

Exam positive for left lower extremity DVT at the level of the knee and proximal calf.

 

The sonographer spoke to Valerie at the physician's office at the end of the exam.

## 2019-11-04 NOTE — CT
EXAMINATION TYPE: CT angio chest

 

DATE OF EXAM: 11/4/2019 5:10 PM

 

COMPARISON: 8/17/2019

 

HISTORY: Shortness of breath.

 

CT DLP: 514 mGy

Automated exposure control for dose reduction was used.

 

CONTRAST: 

CTA scan of the thorax is performed with IV Contrast, patient injected with 100 mL of Isovue 370, pul
monary embolism protocol. .  

 

FINDINGS:

 

LUNGS: There are numerous pulmonary nodules within the lungs noted bilaterally. Estimated 30-40 nodul
es. There appear to be increased in number relative to the previous PET/CT of 8/17/2019. The largest 
in the left upper lobe measures 1.5 x 1.2 cm and previously measured 1 x 0.8 cm. Within the right upp
er lobe the largest measures 1.4 cm and previously measured 0.8 cm. Nodule is seen increasing both in
 number and size involving virtually all lobes.

 

Bilateral consolidation is seen with small effusion. Groundglass changes are seen within the lungs bi
laterally correlate for pneumonitis.

 

MEDIASTINUM: There is satisfactory enhancement of the pulmonary artery and its branches, there is no 
CT evidence for pulmonary embolism.  There are no greater than 1 cm hilar or mediastinal lymph nodes.
   No pericardial effusion is seen. 

 

 

OTHER:  There is a large subcutaneous nodule involving the chest in the posterior soft tissues previo
usly measured 1.6 cm and now measures 2.5 cm. Additional soft tissue nodules are seen in the subcutan
eous tissues particularly on the right as well as on the left on axial image 52 in the subcutaneous t
issues of the left upper extremity

 

Enlarged with multiple masses the largest measuring 5.8 cm. This previously measured 4.1 cm. There is
 interval marked enlargement of the adrenal gland on the left now measuring 4.9 cm compatible with me
tastases.

Hypertrophic and degenerative changes of the vertebral column. Mediport catheter seen. There is a sma
ll 1 cm hypodensity within the posterior margin of the right lobe of the liver axial image 145 suspic
ious for metastatic lesion. Additional 5 mm hypodensity in the left lobe of the liver on axial image 
127 2 small to characterize.

There is a left thyroid nodule measuring 2.3 cm which is retrospectively stable.

 

 

IMPRESSION:

1. There is interval development of a bilateral consolidation and tiny left effusion with subtle grou
ndglass changes seen bilaterally. Correlate for alveolitis or pneumonitis.

2. No diagnostic evidence of pulmonary embolism.

3. There is marked interval progression in the size and number of pulmonary nodules noted diffusely a
nd within bilateral lung fields as measured above.

4. Splenomegaly with increase in size of the largest splenic lesion measuring 5.8 cm compatible with 
worsening metastatic disease.

5. Subcutaneous and hepatic metastases. 6 worsening left adrenal metastases as measured above

 

A Orange level critical message alert has been initiated for Rene Musa MD via the [employer] 36
0 | Critical Results System on 11/4/2019 5:24 PM.  This message alert has been sent to Rene Musa MD via the preferences provided by the clinician for the receipt of Radiology Critical Findings. [facility] [place] ID [id number].

## 2019-11-05 LAB
ALBUMIN SERPL-MCNC: 3 G/DL (ref 3.5–5)
ALP SERPL-CCNC: 119 U/L (ref 38–126)
ALT SERPL-CCNC: 130 U/L (ref 9–52)
ANION GAP SERPL CALC-SCNC: 8 MMOL/L
AST SERPL-CCNC: 228 U/L (ref 14–36)
BASOPHILS # BLD AUTO: 0 K/UL (ref 0–0.2)
BASOPHILS NFR BLD AUTO: 0 %
BUN SERPL-SCNC: 34 MG/DL (ref 7–17)
CALCIUM SPEC-MCNC: 8.5 MG/DL (ref 8.4–10.2)
CHLORIDE SERPL-SCNC: 104 MMOL/L (ref 98–107)
CO2 SERPL-SCNC: 24 MMOL/L (ref 22–30)
EOSINOPHIL # BLD AUTO: 0 K/UL (ref 0–0.7)
EOSINOPHIL NFR BLD AUTO: 0 %
ERYTHROCYTE [DISTWIDTH] IN BLOOD BY AUTOMATED COUNT: 5.11 M/UL (ref 3.8–5.4)
ERYTHROCYTE [DISTWIDTH] IN BLOOD: 13.4 % (ref 11.5–15.5)
GLUCOSE SERPL-MCNC: 128 MG/DL (ref 74–99)
HCT VFR BLD AUTO: 44.5 % (ref 34–46)
HGB BLD-MCNC: 14.4 GM/DL (ref 11.4–16)
LYMPHOCYTES # SPEC AUTO: 4.5 K/UL (ref 1–4.8)
LYMPHOCYTES NFR SPEC AUTO: 43 %
MCH RBC QN AUTO: 28.2 PG (ref 25–35)
MCHC RBC AUTO-ENTMCNC: 32.4 G/DL (ref 31–37)
MCV RBC AUTO: 87.1 FL (ref 80–100)
MONOCYTES # BLD AUTO: 0.4 K/UL (ref 0–1)
MONOCYTES NFR BLD AUTO: 3 %
NEUTROPHILS # BLD AUTO: 5.4 K/UL (ref 1.3–7.7)
NEUTROPHILS NFR BLD AUTO: 52 %
PLATELET # BLD AUTO: 107 K/UL (ref 150–450)
POTASSIUM SERPL-SCNC: 5.1 MMOL/L (ref 3.5–5.1)
PROT SERPL-MCNC: 5.9 G/DL (ref 6.3–8.2)
SODIUM SERPL-SCNC: 136 MMOL/L (ref 137–145)
WBC # BLD AUTO: 10.4 K/UL (ref 3.8–10.6)

## 2019-11-05 RX ADMIN — SERTRALINE HYDROCHLORIDE SCH MG: 100 TABLET ORAL at 08:19

## 2019-11-05 RX ADMIN — PANTOPRAZOLE SODIUM SCH MG: 40 TABLET, DELAYED RELEASE ORAL at 06:15

## 2019-11-05 RX ADMIN — ENOXAPARIN SODIUM SCH MG: 80 INJECTION SUBCUTANEOUS at 22:08

## 2019-11-05 RX ADMIN — ENOXAPARIN SODIUM SCH MG: 80 INJECTION SUBCUTANEOUS at 12:04

## 2019-11-05 RX ADMIN — SERTRALINE HYDROCHLORIDE SCH MG: 100 TABLET ORAL at 22:22

## 2019-11-05 RX ADMIN — Medication SCH MG: at 22:22

## 2019-11-05 RX ADMIN — PROCHLORPERAZINE MALEATE PRN MG: 10 TABLET, FILM COATED ORAL at 06:38

## 2019-11-05 RX ADMIN — PROCHLORPERAZINE MALEATE PRN MG: 10 TABLET, FILM COATED ORAL at 22:31

## 2019-11-05 NOTE — P.PN
Subjective


Progress Note Date: 11/05/19


Patient is a 74-year-old female with a PMH of metastatic melanoma diagnosed ~18 

months ago s/p Opdivo for 11 months, currently on Mekinist and Tefinlar for the 

past 7 days, sent to the ED form Dr Arellano's office for SOB. The patient had 

presented to his office earlier today for her routine appointment where Mirta Bruno NP noted that the patient appeared more SOB. Patient endorsed to her that 

she has been getting tired and short of breath quickly since she began her new 

chemotherapy regimen. There was also concerns regarding the swelling and warmth 

of her LLE and the patient was subsequently sent to the ED. Upon arrival, the 

patient's vital signs were P 102, /93, T 98.1, and SpO2 96% on RA. The 

patient reported that along with the decreased exercise tolerance with shortness

of breath, she has also had a persistent non-productive cough for the past few 

weeks to months. Patient reports no changes in her legs and states that since 

her knee surgeries, she has always had some LE swelling and hadn't noticed 

anything unusual. She denied leg pain, recent travel. She also denied chest 

pain, dizziness, palpitations. She denied vomiting, diarrhea, or abdominal pain.

She underwent an extensive evaluation in the ED w/ venous doppler of the LLE 

positive for DVT from popliteal vein to the proximal calf veins. Chest CTA 

revealed extensive progressive metastatic disease involving the lungs, spleen, 

and liver without any evidence of PE. EKG revealed sinus tachycardia @ 105 bpm 

w/ TWI and q-wave in lead III. Laboratory evaluation revealed WBC count of 14.9,

platelets of 103, , , alk phos 162, sodium 136, potassium 4.6, BUN

37, and creatinine 0.68.  Patient was admitted for further management.





Today patient reports that she still is short of breath as she was at home and 

she talk to Dr. mora earlier today and is thinking of getting a second opinion 

for her metastatic melanoma that has failed 2 types of treatments.  Patient 

denies chest pain, palpitation, nausea, vomiting, fever, chills and denies rest 

of the review system.








Objective





- Vital Signs


Vital signs: 


                                   Vital Signs











Temp  98.1 F   11/05/19 12:00


 


Pulse  96   11/05/19 12:00


 


Resp  18   11/05/19 12:00


 


BP  147/89   11/05/19 12:00


 


Pulse Ox  96   11/05/19 12:00








                                 Intake & Output











 11/04/19 11/05/19 11/05/19





 18:59 06:59 18:59


 


Intake Total  160 480


 


Balance  160 480


 


Weight 75.296 kg 74 kg 


 


Intake:   


 


  IV  160 


 


    0.9  160 


 


  Oral   480


 


Other:   


 


  Voiding Method  Toilet 


 


  # Voids  1 2














- Constitutional


General appearance: Present: cooperative, no acute distress





- Neck


Neck: Present: normal ROM.  Absent: lymphadenopathy, rigidity, stridor, 

thyromegaly





- Respiratory


Respiratory: bilateral: CTA, rales (Scattered and fine.), negative: dullness, 

rhonchi, wheezing





- Cardiovascular


Rhythm: regular


Heart sounds: normal: S1, S2


Abnormal Heart Sounds: Absent: systolic murmur, diastolic murmur, S3 Gallop, S4 

Gallop





- Gastrointestinal


General gastrointestinal: Present: normal bowel sounds, soft.  Absent: 

distended, rigid, tenderness





- Neurologic


Neurologic: Present: CNII-XII intact.  Absent: focal deficits





- Psychiatric


Psychiatric: Present: A&O x's 3, appropriate affect, intact judgment & insight





- Allied health notes


Allied health notes reviewed: nursing





- Labs


CBC & Chem 7: 


                                 11/05/19 05:34





                                 11/05/19 05:34


Labs: 


                  Abnormal Lab Results - Last 24 Hours (Table)











  11/04/19 11/04/19 11/04/19 Range/Units





  18:09 18:09 18:09 


 


WBC  14.9 H    (3.8-10.6)  k/uL


 


RBC  5.56 H    (3.80-5.40)  m/uL


 


Hct  48.5 H    (34.0-46.0)  %


 


Plt Count  103 L    (150-450)  k/uL


 


Neutrophils #  8.4 H    (1.3-7.7)  k/uL


 


Lymphocytes #  5.6 H    (1.0-4.8)  k/uL


 


APTT    84.3 H  (22.0-30.0)  sec


 


Sodium   136 L   (137-145)  mmol/L


 


BUN   37 H   (7-17)  mg/dL


 


Glucose   102 H   (74-99)  mg/dL


 


AST   275 H   (14-36)  U/L


 


ALT   133 H   (9-52)  U/L


 


Alkaline Phosphatase   162 H   ()  U/L


 


Total Protein     (6.3-8.2)  g/dL


 


Albumin   3.4 L   (3.5-5.0)  g/dL














  11/05/19 11/05/19 Range/Units





  05:34 05:34 


 


WBC    (3.8-10.6)  k/uL


 


RBC    (3.80-5.40)  m/uL


 


Hct    (34.0-46.0)  %


 


Plt Count  107 L   (150-450)  k/uL


 


Neutrophils #    (1.3-7.7)  k/uL


 


Lymphocytes #    (1.0-4.8)  k/uL


 


APTT    (22.0-30.0)  sec


 


Sodium   136 L  (137-145)  mmol/L


 


BUN   34 H  (7-17)  mg/dL


 


Glucose   128 H  (74-99)  mg/dL


 


AST   228 H  (14-36)  U/L


 


ALT   130 H  (9-52)  U/L


 


Alkaline Phosphatase    ()  U/L


 


Total Protein   5.9 L  (6.3-8.2)  g/dL


 


Albumin   3.0 L  (3.5-5.0)  g/dL














Assessment and Plan


Plan: 





Acute LLE DVT in setting of malignancy


-Start patient on Lovenox


-Heme/Onc consulted, seen by Dr. Arellano's NP today and their input awaited.


-Monitor CBC





Shortness of breath, possibly due to extensive metastatic melanoma disease 

burden


-Monitor for now


-Supplemental oxygen


-Echocardiogram - Normal LV size, Moderate concentric LVH, EF-60-65%, other 

chambers and valves are all normal.





Thrombocytopenia


-Likely due to chemotherapy


-Monitor for now - no worsening noted.





Leukocytosis


-Likely due to underlying malignancy - Resolved.








Abnormal LFTs


-Possibly secondary to metastatic disease of the liver


-Monitor for now





Chronic conditions: HTN, HLD


-C/w home meds





DVT prophylaxis


-Pt. is already on full dose Lovenox for LLEx DVT.

## 2019-11-05 NOTE — ECHOF
Referral Reason:C43.59,[id number]



MEASUREMENTS

--------

HEIGHT: 157.5 cm

WEIGHT: 75.3 kg

BP: 138/86

RVIDd:   3.1 cm     (< 3.3)

IVSd:   1.4 cm     (0.6 - 1.1)

LVIDd:   3.7 cm     (3.9 - 5.3)

LVPWd:   1.2 cm     (0.6 - 1.1)

IVSs:   1.6 cm

LVIDs:   2.7 cm

LVPWs:   1.4 cm

LA Diam:   2.4 cm     (2.7 - 3.8)

Ao Diam:   3.5 cm     (2.0 - 3.7)

AV Cusp:   1.9 cm     (1.5 - 2.6)

MV E Primitivo:   0.46 m/s

MV DecT:   285 ms

MV A Primitivo:   0.85 m/s

MV E/A Ratio:   0.55 

TAPSE:   22.47 mm







FINDINGS

--------

Sinus rhythm.

This was a technically adequate study.

The left ventricular size is normal.   There is moderate concentric left ventricular hypertrophy.   O
verall left ventricular systolic function is normal with, an EF between 60 - 65 %.

The right ventricle is normal in size.

The left atrial size is normal.

The right atrium is normal in size.

Interatrial and interventricular septum intact.

The aortic valve is trileaflet and appears structurally normal.

The mitral valve is normal.

The tricuspid valve appears structurally normal.

There is no pulmonic regurgitation present.

The aortic root size is normal.

Normal inferior vena cava with normal inspiratory collapse consistent with estimated right atrial pre
ssure of  5 mmHg.

There is no pericardial effusion.



CONCLUSIONS

--------

1. Sinus rhythm.

2. This was a technically adequate study.

3. The left ventricular size is normal.

4. There is moderate concentric left ventricular hypertrophy.

5. Overall left ventricular systolic function is normal with, an EF between 60 - 65 %.

6. The right ventricle is normal in size.

7. The left atrial size is normal.

8. The right atrium is normal in size.

9. Interatrial and interventricular septum intact.

10. The aortic valve is trileaflet and appears structurally normal.

11. The mitral valve is normal.

12. The tricuspid valve appears structurally normal.

13. There is no pulmonic regurgitation present.

14. The aortic root size is normal.

15. Normal inferior vena cava with normal inspiratory collapse consistent with estimated right atrial
 pressure of  5 mmHg.

16. There is no pericardial effusion.





SONOGRAPHER: Jayshree Gan RDCS

## 2019-11-05 NOTE — P.CONS
History of Present Illness





- Reason for Consult


Consult date: 11/05/19


shortness of breath, left lower extremity DVT, melanoma





- History of Present Illness





   Ms Killian is a 74 yr old white female with overall well-controlled medical 

problems.  The patient was found to have a suspicious lesion on the right upper 

back and underwent excision on 10/11/18. she had first noticed a "growth" around

12/17.  This had slowly grown in size, and by 9/18 was causing symptoms like 

increasing and mild discomfort.  This revealed malignant melanoma, 16 mm with 

tumor extending to the deep margin.  Tumor was 1.9 cm, with mitosis/millimeter 

square, with ulceration.  The patient was referred to the [facility], and had wide excision with sentinel node biopsy on 12/6/18.  Section 

and nodes were localize to the right axilla, and 3 of 3 were involved.  She had 

staging workup on 12/18/18 with CT of the chest abdomen and pelvis and MRI of 

the brain revealing no distant metastasis.  Scattered subcentimeter nodules was 

seen in the lung, along with conglomerate right axillary mass measuring 5.9 x 

4.1 cm and left axillary albertina mass measuring 3.7 x 2.6 cm.  The patient then 

underwent bilateral axillary node dissection on 1/2/19.  On the right melanoma 

was noted in 1/12 lymph nodes with no extranodal extension.  On the left 9 lymph

nodes were negative.  Her tumor was BRAF V600 positive.


  she was seen by oncology at the [facility].  She was staged as 

stage IIIc, pT4 b N 3b. Adjuvant therapy was recommended, with Nivolumab 

preferred, versus Dabrafenib + Trametinib combination based on side effect 

profile.   


  she was referred here for further treatment, as she desired that closer to 

home.


   she denied any prior history of malignancy. there is no history of any 

autoimmune disease.


   Opdivo was planned, but delayed as the pt could not get her restaging CT 

scans and brain MRI done till 3/28/19. She was supposed to start on 4/5/19


  CT scans however showed subq nodules  in the rt posterior chest wall area 1 x 

0.7 and 1 x 0.8. A 4.9 cm density was noted in thezzx rt axilla and a 2.4 cm 

density in the left axilla.


   she had a PET and done, which confirmed a 9 mm subcutaneous mass just 

inferior and lateral to the right scapula, with SUV of 3.71.  Another mass was 

noted in the right upper back along the superior aspect of the scapula, about 1 

cm, which was suspicious in appearance but ametabolic.  CBC, CMP and LDH were 

normal.   


   She was seen back at the [facility], and case d/w Dr Harrison. It was recommended that

she proceed with Opdivo, and be reassessed for possible resection down the line 

depending on response.


   She started opdivo on 5/17/19 after Port placement ( at her request), and is 

s/p 7 cycles   


   She has noted an increase in size of the mass overlying the rt scapula, with 

mild soreness. She has also felt a new nodule on her RUE


   MRI was repeated on 8/8/19 as the pt was c./o dizziness. This showed stable 

white matter changes.


   On clinical exam, the right upper back mass seemed to show progressive 

increase in size.  Therefore PET scan was performed on 8/17/19.  This revealed 

increase in size of the right upper back mass, with elevated SUV.  The right 

midback lesion near the scapular tip is less well defined but was larger in size

at 1.8 cm, also PET positive.  Bilateral lung nodules were noted, some new, with

at least some showing increase in size from before.  Lung nodules are however 

not PET avid.  3.5 cm indeterminate lesion was seen in the spleen, as well as a 

questionable focus of uptake in the central portion of the liver.


   Case was d/w [facility], and it was decided to continue for 2 more cycles, to r/o 

pseudoprogression.


   CT scan, on 10/16/19, however, confirms progression with multiple bilateral 

lung nodules at least 20, new subcutaneous nodules, as well as metastatic 

lesions now seen in the liver, bilateral adrenal glands, and spleen


    The patient was therefore started on Tafinlar-Mekinist.  He had been on this

regimen for just about a week when seen in the office on 11/4/19.  She had 

multiple complaints, including subjective fevers, increased shortness of breath,

increasing generalized weakness as well as intermittent nausea and occasional 

vomiting.  She was noted to have lower extremity swelling.


  She was therefore sent in to the emergency room, and had a Doppler and CTA 

done.  Dopplers revealed left lower extremity DVT extending from the popliteal 

into the calf veins.  CTA was negative for PE, but did show bilateral ground 

glass opacities suspicious for pneumonitis, in addition to the metastatic lung 

nodules.  The scan also mention progression of adrenal metastasis and liver 

metastasis but was compared to the PET scan done in 8/19 and not the most recent

CT scan from 10/19. 


  In the office, liver enzymes also showed near doubling into the 200 range, 

compared to  on 10/16/19


  Should was therefore admitted for further management and consult placed





Review of Systems


Constitutional: Reports fatigue, Reports poor appetite, Reports weakness


Eyes: denies blurred vision, denies pain


Ears: deny: decreased hearing, ear discharge, earache, tinnitus


Ears, nose, mouth and throat: Denies headache, Denies sore throat


Cardiovascular: Reports palpitations, Reports shortness of breath


Respiratory: Reports dyspnea


Gastrointestinal: Reports nausea, Reports vomiting


Genitourinary: Denies dysuria, Denies hematuria


Menstruation: Reports postmenopausal


Musculoskeletal: Reports muscle weakness


Integumentary: Reports as per HPI (skin nodules due to metastatic disease right 

upper back and right posterolateral chest wall)


Neurological: Reports weakness, Denies numbness


Psychiatric: Denies anxiety, Denies depression


Endocrine: Reports fatigue, Reports weight change


Hematologic/Lymphatic: Reports as per HPI, Reports thrombophilia





Past Medical History


Past Medical History: Cancer, Hyperlipidemia, Hypertension, Pneumonia


Additional Past Medical History / Comment(s): metatastic melanoma dx 2018


History of Any Multi-Drug Resistant Organisms: None Reported


Past Surgical History: Bladder Surgery, Hernia Repair, Hysterectomy, Orthopedic 

Surgery


Additional Past Surgical History / Comment(s): tumor removal ight breast


Past Anesthesia/Blood Transfusion Reactions: No Reported Reaction


Past Psychological History: No Psychological Hx Reported


Smoking Status: Never smoker


Past Alcohol Use History: None Reported


Past Drug Use History: None Reported





Medications and Allergies


                                Home Medications











 Medication  Instructions  Recorded  Confirmed  Type


 


Meloxicam [Mobic] 7.5 mg PO BID 11/04/19 11/04/19 History


 


Nortriptyline [Pamelor] 10 mg PO HS 11/04/19 11/04/19 History


 


Nystatin-Triamcinolone Oint 1 applic TOPICAL BID PRN 11/04/19 11/04/19 History





[Mycolog 100,000-0.1 Unit/gm-%    





Oint]    


 


Omeprazole 20 mg PO DAILY 11/04/19 11/04/19 History


 


Prochlorperazine [Compazine] 10 mg PO Q6H PRN 11/04/19 11/04/19 History


 


Sertraline [Zoloft] 100 mg PO BID 11/04/19 11/04/19 History


 


Tafinlar 75mg Capsule 150 mg PO Q12H 11/04/19 11/04/19 History


 


Trametinib Dimethyl Sulfoxide 2 mg PO DAILY 11/04/19 11/04/19 History





[Mekinist]    


 


amLODIPine BESYLATE [Norvasc] 2.5 mg PO DAILY 11/04/19 11/04/19 History








                                    Allergies











Allergy/AdvReac Type Severity Reaction Status Date / Time


 


ciprofloxacin [From Cipro] Allergy  Hallucinati Verified 11/04/19 20:32





   ons  


 


Penicillins Allergy  Unknown Verified 11/04/19 20:32





   Childhood  














Physical Exam


Vitals: 


                                   Vital Signs











  Temp Pulse Resp BP Pulse Ox


 


 11/05/19 19:53  98.2 F  86  18  133/84 


 


 11/05/19 16:00  98 F  88  16  126/71  96


 


 11/05/19 12:00  98.1 F  96  18  147/89  96


 


 11/05/19 08:00  98 F  89  18  109/68  96


 


 11/05/19 04:00  97.2 F L  100  18  129/83  94 L


 


 11/05/19 00:00  97.4 F L  96  18  135/81  96








                                Intake and Output











 11/05/19 11/05/19 11/05/19





 06:59 14:59 22:59


 


Intake Total 160 480 500


 


Balance 160 480 500


 


Intake:   


 


    


 


    0.9 160  


 


  Oral  480 500


 


Other:   


 


  Voiding Method Toilet  


 


  # Voids 1 2 2


 


  Weight 74 kg  














- Constitutional


General appearance: mild distress





- EENT


Eyes: EOMI, PERRLA


ENT: hearing grossly normal, normal oropharynx





- Neck


Neck: no lymphadenopathy


Thyroid: bilateral: normal size





- Respiratory


Respiratory: bilateral: CTA





- Cardiovascular


Rhythm: regular


Heart sounds: normal: S1, S2





- Gastrointestinal


General gastrointestinal: hepatomegaly, normal bowel sounds, soft





- Integumentary





 3-3.5 cm nodule the right upper back, and 2.5 cm nodule posterolateral right 

chest wall in posterior axillary line





- Neurologic


Neurologic: CNII-XII intact, focal deficits





- Musculoskeletal


Musculoskeletal: generalized weakness, strength equal bilaterally





- Psychiatric


Psychiatric: A&O x's 3





Results


CBC & Chem 7: 


                                 11/05/19 05:34





                                 11/05/19 05:34


Labs: 


                  Abnormal Lab Results - Last 24 Hours (Table)











  11/05/19 11/05/19 Range/Units





  05:34 05:34 


 


Plt Count  107 L   (150-450)  k/uL


 


Sodium   136 L  (137-145)  mmol/L


 


BUN   34 H  (7-17)  mg/dL


 


Glucose   128 H  (74-99)  mg/dL


 


AST   228 H  (14-36)  U/L


 


ALT   130 H  (9-52)  U/L


 


Total Protein   5.9 L  (6.3-8.2)  g/dL


 


Albumin   3.0 L  (3.5-5.0)  g/dL











Comments: 





echocardiogram report reviewed


CT scan - chest: report reviewed


Venous US: report reviewed





Assessment and Plan


(1) Acute DVT (deep venous thrombosis)


Narrative/Plan: 


  Agent is at risk because of her metastatic malignancy.  She was also check for

a PE because of her shortness of breath and was found to be negative for the 

same.  Case was discussed with the emergency room physician, and treatment with 

IV heparin recommended to begin with.  The patient can subsequently be switched 

to one of the DO ACs for oral outpatient anticoagulation


Current Visit: Yes   Status: Acute   Code(s): I82.409 - ACUTE EMBOLISM AND 

THOMBOS UNSP DEEP VN UNSP LOWER EXTREMITY   SNOMED Code(s): 015293974994417


   





(2) Respiratory insufficiency


Narrative/Plan: 


 The patient has known lung metastasis, with significant progression noted on 

CAT scan done last month.  The current CAT scan does show bilateral ground glass

opacities in addition to the lung nodules.  These are likely to be the etiology 

of her shortness of breath as the patient was relatively asymptomatic at the 

time of her previous CAT scan in 10/19


 The etiology of these opacities which are felt to represent pneumonitis is not 

currently known.  The patient had been having fever and therefore infection is 

possible.  She has been started on antibiotics.  However medication affect is 

more likely.  These could represent autoimmune pneumonitis from opdivo occurring

at a somewhat delayed phenomenon as her last dose was about 3 weeks ago.  In 

addition her current regimen specifically Tafinlar, also cause pneumonitis


  Therefore it was recommended during discussion with the ER physician that the 

patient be placed on IV steroids.  Current anticancer medications have been 

held.  Report improvement in her symptoms.  Continue to monitor on steroids.  If

the patient improves further she can be switched to oral steroids and then start

a taper.


Current Visit: Yes   Status: Acute   Code(s): R06.89 - OTHER ABNORMALITIES OF 

BREATHING   SNOMED Code(s): 622107249


   





(3) Metastatic melanoma


Narrative/Plan: 


 Issues current presentation makes her situation quite complicated in terms of 

treatment options.  She had progressed on opdivo as noted.  He is now presenting

with multiple complaints that could, at least in part, represent adverse drug 

events.  These include her liver enzyme elevation, as well as pneumonitis.  In 

addition it is difficult to determine (if these are adverse drug events) if 

these represent a delayed autoimmune phenomenon due to opdivo, or are caused by 

her current regimen, as both are possible.


  The above was discussed with the patient.  This time anticancer treatment is 

on hold.  If her acute situation improves, I would recommend starting her back 

on Tafinlar alone.  This could carry the risk of recurrent symptoms, but we have

to make sure if reactions are indeed due to her current regimen, as in that 

case, her treatment options would be extremely limited.  Mekinist in any case, 

has to be held for at least 3 weeks after a VTE


  Pt expressed understanding of the same.


Current Visit: Yes   Status: Acute   Code(s): C79.9 - SECONDARY MALIGNANT 

NEOPLASM OF UNSPECIFIED SITE   SNOMED Code(s): 035015501

## 2019-11-06 LAB
BASOPHILS # BLD AUTO: 0 K/UL (ref 0–0.2)
BASOPHILS NFR BLD AUTO: 0 %
EOSINOPHIL # BLD AUTO: 0 K/UL (ref 0–0.7)
EOSINOPHIL NFR BLD AUTO: 0 %
ERYTHROCYTE [DISTWIDTH] IN BLOOD BY AUTOMATED COUNT: 4.8 M/UL (ref 3.8–5.4)
ERYTHROCYTE [DISTWIDTH] IN BLOOD: 13.4 % (ref 11.5–15.5)
GLUCOSE BLD-MCNC: 130 MG/DL (ref 75–99)
HCT VFR BLD AUTO: 41.9 % (ref 34–46)
HGB BLD-MCNC: 13.6 GM/DL (ref 11.4–16)
LYMPHOCYTES # SPEC AUTO: 4 K/UL (ref 1–4.8)
LYMPHOCYTES NFR SPEC AUTO: 42 %
MCH RBC QN AUTO: 28.4 PG (ref 25–35)
MCHC RBC AUTO-ENTMCNC: 32.5 G/DL (ref 31–37)
MCV RBC AUTO: 87.3 FL (ref 80–100)
MONOCYTES # BLD AUTO: 0.5 K/UL (ref 0–1)
MONOCYTES NFR BLD AUTO: 5 %
NEUTROPHILS # BLD AUTO: 4.7 K/UL (ref 1.3–7.7)
NEUTROPHILS NFR BLD AUTO: 50 %
PLATELET # BLD AUTO: 106 K/UL (ref 150–450)
WBC # BLD AUTO: 9.5 K/UL (ref 3.8–10.6)

## 2019-11-06 RX ADMIN — METHYLPREDNISOLONE SODIUM SUCCINATE SCH MG: 125 INJECTION, POWDER, FOR SOLUTION INTRAMUSCULAR; INTRAVENOUS at 23:10

## 2019-11-06 RX ADMIN — PANTOPRAZOLE SODIUM SCH MG: 40 TABLET, DELAYED RELEASE ORAL at 06:46

## 2019-11-06 RX ADMIN — INSULIN ASPART SCH: 100 INJECTION, SOLUTION INTRAVENOUS; SUBCUTANEOUS at 21:05

## 2019-11-06 RX ADMIN — SERTRALINE HYDROCHLORIDE SCH MG: 100 TABLET ORAL at 21:04

## 2019-11-06 RX ADMIN — SERTRALINE HYDROCHLORIDE SCH MG: 100 TABLET ORAL at 08:29

## 2019-11-06 RX ADMIN — HYDROCODONE BITARTRATE AND ACETAMINOPHEN PRN EACH: 5; 325 TABLET ORAL at 15:48

## 2019-11-06 RX ADMIN — HYDROCODONE BITARTRATE AND ACETAMINOPHEN PRN EACH: 5; 325 TABLET ORAL at 23:10

## 2019-11-06 RX ADMIN — Medication SCH MG: at 21:04

## 2019-11-06 RX ADMIN — METHYLPREDNISOLONE SODIUM SUCCINATE SCH MG: 125 INJECTION, POWDER, FOR SOLUTION INTRAMUSCULAR; INTRAVENOUS at 15:50

## 2019-11-06 RX ADMIN — ENOXAPARIN SODIUM SCH MG: 80 INJECTION SUBCUTANEOUS at 21:04

## 2019-11-06 RX ADMIN — ENOXAPARIN SODIUM SCH MG: 80 INJECTION SUBCUTANEOUS at 08:30

## 2019-11-06 RX ADMIN — SENNOSIDES SCH MG: 8.6 TABLET, FILM COATED ORAL at 21:04

## 2019-11-06 RX ADMIN — HYDROCODONE BITARTRATE AND ACETAMINOPHEN PRN EACH: 5; 325 TABLET ORAL at 10:04

## 2019-11-06 NOTE — P.PN
Subjective


Progress Note Date: 11/06/19


Principal diagnosis: 





Difficulty in breathing, left lower extremity swelling.  Metastatic melanoma.





In follow-up today patient's respiratory status is significantly improved from y

esterday.  She has started Lovenox for left lower extremity DVT, tolerating 

well, no other bleeding to report.  She was just started on steroids this 

evening.  She thinks she may be constipated.  She denies any pain, no other 

complaints





Objective





- Vital Signs


Vital signs: 


                                   Vital Signs











Temp  97.6 F   11/06/19 15:55


 


Pulse  80   11/06/19 15:55


 


Resp  18   11/06/19 16:00


 


BP  152/83   11/06/19 15:55


 


Pulse Ox  95   11/06/19 15:55








                                 Intake & Output











 11/05/19 11/06/19 11/06/19





 18:59 06:59 18:59


 


Intake Total 980 240 240


 


Balance 980 240 240


 


Weight  75.6 kg 


 


Intake:   


 


  Oral 980 240 240


 


Other:   


 


  Voiding Method  Toilet 


 


  # Voids 2 1 2














- Constitutional


General appearance: Present: average body habitus, cooperative, no acute 

distress





- EENT


Eyes: Present: anicteric sclerae, EOMI


ENT: Present: normal oropharynx





- Neck


Neck: Present: normal ROM





- Respiratory


Respiratory: bilateral: CTA





- Cardiovascular


Rhythm: regular


Heart sounds: normal: S1, S2


Abnormal Heart Sounds: Present: systolic murmur





- Peripheral edema


  ** leg


Peripheral Edema: right: None, left: 2+





- Gastrointestinal


General gastrointestinal: Present: normal bowel sounds, soft





- Neurologic


Neurologic: Present: CNII-XII intact





- Musculoskeletal


Musculoskeletal: Present: strength equal bilaterally





- Psychiatric


Psychiatric: Present: A&O x's 3, appropriate affect, intact judgment & insight





- Labs


CBC & Chem 7: 


                                 11/06/19 06:05





                                 11/05/19 05:34


Labs: 


                  Abnormal Lab Results - Last 24 Hours (Table)











  11/06/19 Range/Units





  06:05 


 


Plt Count  106 L  (150-450)  k/uL








                      Microbiology - Last 24 Hours (Table)











 11/04/19 21:10 Blood Culture - Preliminary





 Blood    No Growth after 24 hours














Assessment and Plan


(1) Acute DVT (deep venous thrombosis)


Narrative/Plan: 


Lovenox prescribed.  Discuss case with , pending prior authorization

for Lovenox.  Quite possibly medication induced (mekinist).  Recommendation is 

for 6 months of anticoagulation with repeat Doppler prior to considering dis

continuation.  Bleeding precautions briefly reviewed.


Current Visit: Yes   Status: Acute   Priority: High   Code(s): I82.409 - ACUTE 

EMBOLISM AND THOMBOS UNSP DEEP VN UNSP LOWER EXTREMITY   SNOMED Code(s): 

032691801223684


   





(2) Metastatic melanoma


Narrative/Plan: 


Patient was on tafinlar/mekinist 7 days.  Due to complication of DVT mekinist 

needs to be held up to 3 weeks until DVT is treated and symptoms are improved.  

It will be resumed at a reduced dose.  This was discussed with her.  She 

verbalized having to stop current dose.  New dose prescription sent.  


Patient will continue tafinlar as prescribed on DC


F/U appt in DC plan


Current Visit: Yes   Status: Chronic   Priority: High   Code(s): C79.9 - 

SECONDARY MALIGNANT NEOPLASM OF UNSPECIFIED SITE   SNOMED Code(s): 564085020


   





(3) Respiratory insufficiency


Narrative/Plan: 


Patient's symptoms are slightly better than on admission.  No PE.  She has been 

started on steroids.  We will see how her respiratory status improves.


Discussed with CM, to evaluate pt for home O2


Current Visit: Yes   Status: Acute   Priority: High   Code(s): R06.89 - OTHER 

ABNORMALITIES OF BREATHING   SNOMED Code(s): 161858029


   


Plan: 





Meds for constipation ordered

## 2019-11-06 NOTE — P.PN
Subjective


Progress Note Date: 11/06/19


Patient is a 74-year-old female with a PMH of metastatic melanoma diagnosed ~18 

months ago s/p Opdivo for 11 months, currently on Mekinist and Tefinlar for the 

past 7 days, sent to the ED form Dr Arellano's office for SOB. The patient had 

presented to his office earlier today for her routine appointment where Mirta Bruno NP noted that the patient appeared more SOB. Patient endorsed to her that 

she has been getting tired and short of breath quickly since she began her new 

chemotherapy regimen. There was also concerns regarding the swelling and warmth 

of her LLE and the patient was subsequently sent to the ED. Upon arrival, the 

patient's vital signs were P 102, /93, T 98.1, and SpO2 96% on RA. The 

patient reported that along with the decreased exercise tolerance with shortness

of breath, she has also had a persistent non-productive cough for the past few 

weeks to months. Patient reports no changes in her legs and states that since 

her knee surgeries, she has always had some LE swelling and hadn't noticed 

anything unusual. She denied leg pain, recent travel. She also denied chest 

pain, dizziness, palpitations. She denied vomiting, diarrhea, or abdominal pain.

She underwent an extensive evaluation in the ED w/ venous doppler of the LLE 

positive for DVT from popliteal vein to the proximal calf veins. Chest CTA 

revealed extensive progressive metastatic disease involving the lungs, spleen, 

and liver without any evidence of PE. EKG revealed sinus tachycardia @ 105 bpm 

w/ TWI and q-wave in lead III. Laboratory evaluation revealed WBC count of 14.9,

platelets of 103, , , alk phos 162, sodium 136, potassium 4.6, BUN

37, and creatinine 0.68.  





On 11/06/2019 patient reportes that she is still short of breath as she was at 

home and she is thinking of getting a second opinion for her metastatic melanoma

that has failed 2 types of treatments, but after being discharged from this 

hospitalization.  Patient did complain of having low back pain and was 

requesting.  Medications.  She was not on pain medications before but is 

requesting something to control her back pain.  Patient also stated that she 

hasn't had a bowel movement for 2 days but she is passing a lot of gas is 

rectum.  Patient denies chest pain, palpitation, nausea, vomiting, fever, chills

and denies rest of the review system.








Objective





- Vital Signs


Vital signs: 


                                   Vital Signs











Temp  98.3 F   11/06/19 12:00


 


Pulse  78   11/06/19 12:00


 


Resp  18   11/06/19 12:00


 


BP  132/78   11/06/19 12:00


 


Pulse Ox  96   11/06/19 12:00








                                 Intake & Output











 11/05/19 11/06/19 11/06/19





 18:59 06:59 18:59


 


Intake Total 980 240 240


 


Balance 980 240 240


 


Weight  75.6 kg 


 


Intake:   


 


  Oral 980 240 240


 


Other:   


 


  Voiding Method  Toilet 


 


  # Voids 2 1 2














- Constitutional


General appearance: Present: cooperative, no acute distress





- EENT


Eyes: Present: EOMI, normal appearance


ENT: Present: hearing grossly normal, NA/AT





- Neck


Neck: Present: normal ROM.  Absent: lymphadenopathy, rigidity, stridor, 

thyromegaly





- Respiratory


Respiratory: bilateral: diminished, dullness (Mild at bases.), rales (At bases, 

with fine rales.), negative: rhonchi, wheezing





- Cardiovascular


Rhythm: regular


Heart sounds: normal: S1, S2


Abnormal Heart Sounds: Absent: systolic murmur, diastolic murmur, S3 Gallop, S4 

Gallop





- Gastrointestinal


General gastrointestinal: Present: normal bowel sounds, soft.  Absent: 

distended, rigid, tenderness





- Neurologic


Neurologic: Present: CNII-XII intact.  Absent: focal deficits





- Psychiatric


Psychiatric: Present: A&O x's 3, appropriate affect, intact judgment & insight





- Allied health notes


Allied health notes reviewed: nursing





- Labs


CBC & Chem 7: 


                                 11/06/19 06:05





                                 11/05/19 05:34


Labs: 


                  Abnormal Lab Results - Last 24 Hours (Table)











  11/06/19 Range/Units





  06:05 


 


Plt Count  106 L  (150-450)  k/uL








                      Microbiology - Last 24 Hours (Table)











 11/04/19 21:10 Blood Culture - Preliminary





 Blood    No Growth after 24 hours














Assessment and Plan


Plan: 





Acute LLE DVT in setting of malignancy


-Start patient on Lovenox, as per UpTo-Date Cancer patient who develops DVT 

without PE has better response with s/c Lovenox than DOAC, care discussed in 

detail with pt. and pros and cons were discussed of anticoagulation along with 

s/e of Lovenox and all questions were answered. Pt.may need to be on AC for 6 

months or more depending upon her response and further new episodes of DVT/PEs. 

Script was written and given to  nurse for prior authorization. If denied then

next medication will be Revaroxaban.


-Heme/Onc consulted, seen by Dr. Arellano's NP today and their input appreciated.


-Monitor CBC





Shortness of breath, possibly due to extensive metastatic melanoma disease 

burden


-Monitor for now, Dr. Arellano recs for I/V steroids as per noted on consult, we will

start her on I/V Methyle-prednisolone scheduled for 24 hours and then will 

change to oral forms.


-Supplemental oxygen, Pt. may benefit from home O2 evaluation.


-Echocardiogram - Normal LV size, Moderate concentric LVH, EF-60-65%, other 

chambers and valves are all normal.





Thrombocytopenia


-Likely due to chemotherapy


-Monitor for now - no worsening noted.





Back Pain


-This could be just due to her being in the hospital bed or may be further 

metastatic disease. Will start her on Hydrocodon/APAP low dose q4 hours PRN and 

monitor.





Constipation


-Pt. was educated about worsening constipation when on Narcotic pain medication.

Will start her on Snnakot on schedule basis withhold parameters.





Leukocytosis


-Likely due to underlying malignancy - Resolved.








Abnormal LFTs


-Possibly secondary to metastatic disease of the liver


-Monitor for now





Chronic conditions: HTN, HLD


-C/w home meds





DVT prophylaxis


-Pt. is already on full dose Lovenox for LLEx DVT.








Time with Patient: Greater than 30 (>50% time spent on counseling and 

coordination of care.)

## 2019-11-07 VITALS — RESPIRATION RATE: 16 BRPM

## 2019-11-07 VITALS — DIASTOLIC BLOOD PRESSURE: 80 MMHG | SYSTOLIC BLOOD PRESSURE: 138 MMHG | HEART RATE: 80 BPM | TEMPERATURE: 98.2 F

## 2019-11-07 LAB
BASOPHILS # BLD AUTO: 0 K/UL (ref 0–0.2)
BASOPHILS NFR BLD AUTO: 0 %
EOSINOPHIL # BLD AUTO: 0 K/UL (ref 0–0.7)
EOSINOPHIL NFR BLD AUTO: 0 %
ERYTHROCYTE [DISTWIDTH] IN BLOOD BY AUTOMATED COUNT: 4.83 M/UL (ref 3.8–5.4)
ERYTHROCYTE [DISTWIDTH] IN BLOOD: 13.3 % (ref 11.5–15.5)
GLUCOSE BLD-MCNC: 125 MG/DL (ref 75–99)
GLUCOSE BLD-MCNC: 127 MG/DL (ref 75–99)
HCT VFR BLD AUTO: 42.3 % (ref 34–46)
HGB BLD-MCNC: 13.5 GM/DL (ref 11.4–16)
LYMPHOCYTES # SPEC AUTO: 3 K/UL (ref 1–4.8)
LYMPHOCYTES NFR SPEC AUTO: 39 %
MCH RBC QN AUTO: 28 PG (ref 25–35)
MCHC RBC AUTO-ENTMCNC: 31.9 G/DL (ref 31–37)
MCV RBC AUTO: 87.6 FL (ref 80–100)
MONOCYTES # BLD AUTO: 0.2 K/UL (ref 0–1)
MONOCYTES NFR BLD AUTO: 2 %
NEUTROPHILS # BLD AUTO: 4.4 K/UL (ref 1.3–7.7)
NEUTROPHILS NFR BLD AUTO: 57 %
PLATELET # BLD AUTO: 112 K/UL (ref 150–450)
WBC # BLD AUTO: 7.7 K/UL (ref 3.8–10.6)

## 2019-11-07 RX ADMIN — SERTRALINE HYDROCHLORIDE SCH MG: 100 TABLET ORAL at 09:48

## 2019-11-07 RX ADMIN — PANTOPRAZOLE SODIUM SCH MG: 40 TABLET, DELAYED RELEASE ORAL at 06:38

## 2019-11-07 RX ADMIN — METHYLPREDNISOLONE SODIUM SUCCINATE SCH MG: 125 INJECTION, POWDER, FOR SOLUTION INTRAMUSCULAR; INTRAVENOUS at 06:38

## 2019-11-07 RX ADMIN — INSULIN ASPART SCH: 100 INJECTION, SOLUTION INTRAVENOUS; SUBCUTANEOUS at 06:34

## 2019-11-07 RX ADMIN — INSULIN ASPART SCH: 100 INJECTION, SOLUTION INTRAVENOUS; SUBCUTANEOUS at 13:03

## 2019-11-07 RX ADMIN — METHYLPREDNISOLONE SODIUM SUCCINATE SCH MG: 125 INJECTION, POWDER, FOR SOLUTION INTRAMUSCULAR; INTRAVENOUS at 11:46

## 2019-11-07 RX ADMIN — SENNOSIDES SCH MG: 8.6 TABLET, FILM COATED ORAL at 09:48

## 2019-11-07 RX ADMIN — ENOXAPARIN SODIUM SCH MG: 80 INJECTION SUBCUTANEOUS at 09:49

## 2019-11-07 NOTE — P.DS
Providers


Date of admission: 


11/04/19 20:05





Expected date of discharge: 11/07/19


Attending physician: 


Eneida Mac MD





Consults: 





                                        





11/04/19 20:06


Consult Physician Urgent 


   Consulting Provider: Connor Arellano


   Consult Reason/Comments: metastatic melanoma


   Do you want consulting provider notified?: Already Contacted











Primary care physician: 


Brian Tatiana








- Discharge Diagnosis(es)


(1) Acute DVT (deep venous thrombosis)


Current Visit: Yes   Status: Acute   Priority: High   





(2) Respiratory insufficiency


Current Visit: Yes   Status: Acute   Priority: High   





(3) Metastatic melanoma


Current Visit: Yes   Status: Chronic   Priority: High   


Hospital Course: 


Patient is a 74-year-old female with a PMH of metastatic melanoma diagnosed ~18 

months ago s/p Opdivo for 11 months, currently on Mekinist and Tefinlar for the 

past 7 days, sent to the ED form Dr Arellano's office for SOB. The patient had 

presented to his office on the day of admission, for her routine appointment, 

where Mirta Bruno NP noted that the patient appeared more SOB. Patient 

endorsed to her that she has been getting tired and short of breath quickly 

since she began her new chemotherapy regimen. There was also concerns regarding 

the swelling and warmth of her LLE and the patient was subsequently sent to the 

ED. Upon arrival to the ED, the patient's vital signs were P 102, /93, T 

98.1, and SpO2 96% on RA. The patient reported that along with the decreased 

exercise tolerance with shortness of breath, she has also had a persistent non-

productive cough for the past few weeks to months. Patient reports no changes in

her legs and states that since her knee surgeries, she has always had some LE 

swelling and hadn't noticed anything unusual. She denied leg pain, recent 

travel. She also denied chest pain, dizziness, palpitations. She denied 

vomiting, diarrhea, or abdominal pain. 





She underwent an extensive evaluation in the ED w/ venous doppler of the LLE 

positive for DVT from popliteal vein to the proximal calf veins. Chest CTA 

revealed No PE but extensive progressive metastatic disease involving the lungs,

spleen, and liver. EKG revealed sinus tachycardia @ 105 bpm w/ TWI and q-wave in

lead III. Laboratory evaluation revealed WBC count of 14.9, platelets of 103, 

, , alk phos 162, sodium 136, potassium 4.6, BUN 37, and 

creatinine 0.68.  Patient was admitted for further management and did well with 

the conservative treatment. Dr. Arellano had recommended for holding her chemo and 

placing her on Steroids with slow tapering and she was placed on S/C LOVENOX due

to her metastatic cancer history.





Patient had talk to Dr. Arellano that she is thinking of getting a second opinion for

her metastatic melanoma that had failed 2 types of treatments an that she is 

thinking for [facility] and Dr. Arellano agreed, as reported by the pt.





Today pt's vitals are stable and she passed oxygen stress test with 92% on RA 

post walking, her lungs are still coarse, heart is regular, abdomen is soft with

BS+.





Patient danita lbe discharged home today.





Acute LLE DVT in setting of metastatic malignancy


-As per UpTo-Date Cancer patient who develops DVT without PE has better response

with s/c Lovenox than DOAC, care discussed in detail with pt. and pros and cons 

were discussed of anticoagulation along with s/e of Lovenox and all questions 

were answered. Pt.may need to be on AC for 6 months or more depending upon her 

response and further new episodes of DVT/PEs. Script was written and given to 

nurse for prior authorization and was approved today. 


-Dr. Arellano's NP, Sondra Bruno in 4 days (11/11/2019).





Shortness of breath, possibly due to extensive metastatic melanoma disease 

burden


-Dr. Arellano recs for I/V steroids as per noted on consult, we will start her on I/V

Methyle-prednisolone scheduled for 24 hours and then changed to oral PREDNISONE,

dose will be tapered as per Dr. Arellano's recommendations on f/u visit with Ms. Bruno

.


-Supplemental oxygen was given while in the hospital, Pt. passed O2 stress test 

and is not a candidate for home O2. 


-Echocardiogram - Normal LV size, Moderate concentric LVH, EF-60-65%, other 

chambers and valves are all normal.





Thrombocytopenia


-Likely due to chemotherapy


-Monitor for now - no worsening noted.





Back Pain


-This could be just due to her being in the hospital bed or may be further 

metastatic disease. Will start her on Hydrocodon/APAP low dose q4 hours PRN and 

monitor. Script for Hydrocodone/APAP 5/325mg 1 q6 hr PRN #12 Zero refills, 

given.





Constipation


-Pt. was educated about worsening constipation when on Narcotic pain medication.

Will start her on Snnakot on schedule basis withhold parameters, script given.





Leukocytosis


-Likely due to underlying malignancy - Resolved.








Abnormal LFTs


-Possibly secondary to metastatic disease of the liver


-Monitor for now





Chronic conditions: HTN, HLD -C/w home meds.





Assessment: 


as above


Patient Condition at Discharge: Serious





Plan - Discharge Summary


Discharge Rx Participant: No


New Discharge Prescriptions: 


New


   Enoxaparin [Lovenox] 75 mg SQ Q12H 30 Days #60 syringe


   HYDROcodone/APAP 5-325MG [Norco 5-325] 1 each PO Q4HR PRN 3 Days #12 tab


     PRN Reason: Pain


   Sennosides [Senokot] 8.6 mg PO BID 30 Days #60 tab


   predniSONE 40 mg PO AS DIRECTED 15 Days #30 tab





Continue


   Prochlorperazine [Compazine] 10 mg PO Q6H PRN


     PRN Reason: Nausea


   Sertraline [Zoloft] 100 mg PO BID


   Omeprazole 20 mg PO DAILY


   Nystatin-Triamcinolone Oint [Mycolog 100,000-0.1 Unit/gm-% Oint] 1 applic 

TOPICAL BID PRN


     PRN Reason: Skin Irritation


   amLODIPine BESYLATE [Norvasc] 2.5 mg PO DAILY


   Nortriptyline [Pamelor] 10 mg PO HS


   Meloxicam [Mobic] 7.5 mg PO BID


   Trametinib Dimethyl Sulfoxide [Mekinist] 2 mg PO DAILY


   Tafinlar 75mg Capsule 150 mg PO Q12H


Discharge Medication List





Meloxicam [Mobic] 7.5 mg PO BID 11/04/19 [History]


Nortriptyline [Pamelor] 10 mg PO HS 11/04/19 [History]


Nystatin-Triamcinolone Oint [Mycolog 100,000-0.1 Unit/gm-% Oint] 1 applic 

TOPICAL BID PRN 11/04/19 [History]


Omeprazole 20 mg PO DAILY 11/04/19 [History]


Prochlorperazine [Compazine] 10 mg PO Q6H PRN 11/04/19 [History]


Sertraline [Zoloft] 100 mg PO BID 11/04/19 [History]


Tafinlar 75mg Capsule 150 mg PO Q12H 11/04/19 [History]


Trametinib Dimethyl Sulfoxide [Mekinist] 2 mg PO DAILY 11/04/19 [History]


amLODIPine BESYLATE [Norvasc] 2.5 mg PO DAILY 11/04/19 [History]


Enoxaparin [Lovenox] 75 mg SQ Q12H 30 Days #60 syringe 11/07/19 [Rx]


HYDROcodone/APAP 5-325MG [Elizabeth 5-325] 1 each PO Q4HR PRN 3 Days #12 tab 

11/07/19 [Rx]


Sennosides [Senokot] 8.6 mg PO BID 30 Days #60 tab 11/07/19 [Rx]


predniSONE 40 mg PO AS DIRECTED 15 Days #30 tab 11/07/19 [Rx]








Follow up Appointment(s)/Referral(s): 


Sondra Bruno NPC [Nurse Practitioner] - 11/11/19 10:30 am


Brian Simpson MD [Primary Care Provider] - 1-2 days


Patient Instructions/Handouts:  Enoxaparin (By injection)


Activity/Diet/Wound Care/Special Instructions: 


HOLD MEKINIST.  MAY RESUME ON 11/26 IF OK WITH PHYSICIAN AT A REDUCED DOSE








pts Lovenox is filled in [facility] pharmacy at $56.11 for a 30 day supply


Discharge Disposition: HOME WITH HOME HEALTH SERVICES

## 2019-11-08 NOTE — P.PN
Subjective


Progress Note Date: 11/07/19





  The patient feels much improved.  She is sitting up in the chair eating in the

time of reevaluation.  She is still using oxygen but appears in no apparent 

distress.  She stated that she was much more comfortable walking to and from the

bathroom.  Appetite had improved.





Objective





- Vital Signs


Vital signs: 


                                   Vital Signs











Temp  98.2 F   11/07/19 12:00


 


Pulse  80   11/07/19 12:00


 


Resp  16   11/07/19 12:00


 


BP  138/80   11/07/19 12:00


 


Pulse Ox  95   11/07/19 12:00








                                 Intake & Output











 11/07/19 11/07/19 11/08/19





 06:59 18:59 06:59


 


Intake Total  360 


 


Balance  360 


 


Weight 74.6 kg  


 


Intake:   


 


  Oral  360 


 


Other:   


 


  Voiding Method Toilet Toilet 


 


  # Voids 1 3 


 


  # Bowel Movements  0 














- Constitutional


General appearance: Present: no acute distress





- EENT


Eyes: Present: EOMI


ENT: Present: hearing grossly normal, normal oropharynx





- Respiratory


Respiratory: bilateral: CTA





- Cardiovascular


Rhythm: regular


Heart sounds: normal: S1, S2





- Gastrointestinal


General gastrointestinal: Present: normal bowel sounds, soft





- Integumentary


Integumentary Comment(s): 





Metastatic lesions right chest wall, Right upper back as described previously





- Neurologic


Neurologic: Present: CNII-XII intact





- Musculoskeletal


Musculoskeletal: Present: generalized weakness, strength equal bilaterally





- Psychiatric


Psychiatric: Present: A&O x's 3, appropriate affect





- Labs


CBC & Chem 7: 


                                 11/07/19 05:30





                                 11/05/19 05:34


Labs: 


                  Abnormal Lab Results - Last 24 Hours (Table)











  11/07/19 11/07/19 11/07/19 Range/Units





  05:30 06:31 13:02 


 


Plt Count  112 L    (150-450)  k/uL


 


POC Glucose (mg/dL)   127 H  125 H  (75-99)  mg/dL








                      Microbiology - Last 24 Hours (Table)











 11/04/19 21:10 Blood Culture - Preliminary





 Blood    No Growth after 72 hours














Assessment and Plan


(1) Acute DVT (deep venous thrombosis)


Narrative/Plan: 


The patient is continuing on IV heparin With improvement in her lower extremity.

 She can be switched to one of the DO ACs  and continue that on discharge. As 

her DVT is related to metastatic malignancy, she will likely need to stay on 

anticoagulation indefinitely as long as she tolerates it well


Status: Acute   Priority: High   Code(s): I82.409 - ACUTE EMBOLISM AND THOMBOS 

UNSP DEEP VN UNSP LOWER EXTREMITY   SNOMED Code(s): 407975560802091


   





(2) Respiratory insufficiency


Narrative/Plan: 


This has improved markedly.  Given the CT appearance, and her good response to 

steroids, it would have to be resumed that drug-induced pneumonitis is a major 

differential diagnosis.  However it is not clear if this is due to her current 

regimen or the previous one


 Plan to switch to oral steroids and continue slow taper as an outpatient.  At 

the time of discharge she will resume her Tafinlar.  Assuming she continues to 

improve,Mekinist will be added back after 2-3 weeks at a lower dose 


Status: Acute   Priority: High   Code(s): R06.89 - OTHER ABNORMALITIES OF 

BREATHING   SNOMED Code(s): 135412767


   





(3) Metastatic melanoma


Narrative/Plan: 


 Treatment plan as described above


Status: Chronic   Priority: High   Code(s): C79.9 - SECONDARY MALIGNANT NEOPLASM

OF UNSPECIFIED SITE   SNOMED Code(s): 551668074

## 2019-11-19 ENCOUNTER — HOSPITAL ENCOUNTER (INPATIENT)
Dept: HOSPITAL 47 - EC | Age: 74
LOS: 4 days | Discharge: HOME | DRG: 871 | End: 2019-11-23
Attending: INTERNAL MEDICINE | Admitting: INTERNAL MEDICINE
Payer: MEDICARE

## 2019-11-19 VITALS — BODY MASS INDEX: 29.8 KG/M2

## 2019-11-19 DIAGNOSIS — R33.8: ICD-10-CM

## 2019-11-19 DIAGNOSIS — Z80.0: ICD-10-CM

## 2019-11-19 DIAGNOSIS — Z87.01: ICD-10-CM

## 2019-11-19 DIAGNOSIS — Z79.1: ICD-10-CM

## 2019-11-19 DIAGNOSIS — J18.9: ICD-10-CM

## 2019-11-19 DIAGNOSIS — Z79.899: ICD-10-CM

## 2019-11-19 DIAGNOSIS — A41.51: Primary | ICD-10-CM

## 2019-11-19 DIAGNOSIS — Z96.659: ICD-10-CM

## 2019-11-19 DIAGNOSIS — Z87.19: ICD-10-CM

## 2019-11-19 DIAGNOSIS — C79.00: ICD-10-CM

## 2019-11-19 DIAGNOSIS — M06.9: ICD-10-CM

## 2019-11-19 DIAGNOSIS — C78.7: ICD-10-CM

## 2019-11-19 DIAGNOSIS — Z87.442: ICD-10-CM

## 2019-11-19 DIAGNOSIS — E44.0: ICD-10-CM

## 2019-11-19 DIAGNOSIS — Z98.890: ICD-10-CM

## 2019-11-19 DIAGNOSIS — C43.9: ICD-10-CM

## 2019-11-19 DIAGNOSIS — Z88.1: ICD-10-CM

## 2019-11-19 DIAGNOSIS — E78.5: ICD-10-CM

## 2019-11-19 DIAGNOSIS — N39.0: ICD-10-CM

## 2019-11-19 DIAGNOSIS — Z88.0: ICD-10-CM

## 2019-11-19 DIAGNOSIS — G89.3: ICD-10-CM

## 2019-11-19 DIAGNOSIS — Z80.1: ICD-10-CM

## 2019-11-19 DIAGNOSIS — Z80.3: ICD-10-CM

## 2019-11-19 DIAGNOSIS — I82.4Z2: ICD-10-CM
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
## 2019-11-19 DIAGNOSIS — Z79.891: ICD-10-CM

## 2019-11-19 DIAGNOSIS — Z90.710: ICD-10-CM

## 2019-11-19 DIAGNOSIS — I10: ICD-10-CM

## 2019-11-19 DIAGNOSIS — Z79.01: ICD-10-CM

## 2019-11-19 DIAGNOSIS — C74.90: ICD-10-CM

## 2019-11-19 DIAGNOSIS — C78.00: ICD-10-CM

## 2019-11-19 LAB
ALBUMIN SERPL-MCNC: 2.7 G/DL (ref 3.5–5)
ALP SERPL-CCNC: 148 U/L (ref 38–126)
ALT SERPL-CCNC: 139 U/L (ref 9–52)
ANION GAP SERPL CALC-SCNC: 7 MMOL/L
APTT BLD: 52.1 SEC (ref 22–30)
AST SERPL-CCNC: 441 U/L (ref 14–36)
BUN SERPL-SCNC: 37 MG/DL (ref 7–17)
CALCIUM SPEC-MCNC: 7.9 MG/DL (ref 8.4–10.2)
CELLS COUNTED: 100
CHLORIDE SERPL-SCNC: 106 MMOL/L (ref 98–107)
CO2 SERPL-SCNC: 23 MMOL/L (ref 22–30)
ERYTHROCYTE [DISTWIDTH] IN BLOOD BY AUTOMATED COUNT: 4.99 M/UL (ref 3.8–5.4)
ERYTHROCYTE [DISTWIDTH] IN BLOOD: 15.8 % (ref 11.5–15.5)
GLUCOSE SERPL-MCNC: 91 MG/DL (ref 74–99)
HCT VFR BLD AUTO: 45 % (ref 34–46)
HGB BLD-MCNC: 14.4 GM/DL (ref 11.4–16)
INR PPP: 1.1 (ref ?–1.2)
LYMPHOCYTES # BLD MANUAL: 8.64 K/UL (ref 1–4.8)
MAGNESIUM SPEC-SCNC: 2.2 MG/DL (ref 1.6–2.3)
MCH RBC QN AUTO: 28.8 PG (ref 25–35)
MCHC RBC AUTO-ENTMCNC: 32 G/DL (ref 31–37)
MCV RBC AUTO: 90.1 FL (ref 80–100)
MONOCYTES # BLD MANUAL: 0.22 K/UL (ref 0–1)
NEUTROPHILS NFR BLD MANUAL: 59 %
NEUTS SEG # BLD MANUAL: 12.74 K/UL (ref 1.3–7.7)
PH UR: 5.5 [PH] (ref 5–8)
PLATELET # BLD AUTO: 154 K/UL (ref 150–450)
POTASSIUM SERPL-SCNC: 3.8 MMOL/L (ref 3.5–5.1)
PROT SERPL-MCNC: 5.4 G/DL (ref 6.3–8.2)
PROT UR QL: (no result)
PT BLD: 11.3 SEC (ref 9–12)
RBC UR QL: 7 /HPF (ref 0–5)
SODIUM SERPL-SCNC: 136 MMOL/L (ref 137–145)
SP GR UR: 1.03 (ref 1–1.03)
SQUAMOUS UR QL AUTO: 5 /HPF (ref 0–4)
UROBILINOGEN UR QL STRIP: 6 MG/DL (ref ?–2)
WBC # BLD AUTO: 21.6 K/UL (ref 3.8–10.6)

## 2019-11-19 PROCEDURE — 87040 BLOOD CULTURE FOR BACTERIA: CPT

## 2019-11-19 PROCEDURE — 71046 X-RAY EXAM CHEST 2 VIEWS: CPT

## 2019-11-19 PROCEDURE — 80053 COMPREHEN METABOLIC PANEL: CPT

## 2019-11-19 PROCEDURE — 81001 URINALYSIS AUTO W/SCOPE: CPT

## 2019-11-19 PROCEDURE — 85025 COMPLETE CBC W/AUTO DIFF WBC: CPT

## 2019-11-19 PROCEDURE — 84484 ASSAY OF TROPONIN QUANT: CPT

## 2019-11-19 PROCEDURE — 83605 ASSAY OF LACTIC ACID: CPT

## 2019-11-19 PROCEDURE — 83735 ASSAY OF MAGNESIUM: CPT

## 2019-11-19 PROCEDURE — 96365 THER/PROPH/DIAG IV INF INIT: CPT

## 2019-11-19 PROCEDURE — 36415 COLL VENOUS BLD VENIPUNCTURE: CPT

## 2019-11-19 PROCEDURE — 99285 EMERGENCY DEPT VISIT HI MDM: CPT

## 2019-11-19 PROCEDURE — 74176 CT ABD & PELVIS W/O CONTRAST: CPT

## 2019-11-19 PROCEDURE — 85027 COMPLETE CBC AUTOMATED: CPT

## 2019-11-19 PROCEDURE — 96375 TX/PRO/DX INJ NEW DRUG ADDON: CPT

## 2019-11-19 PROCEDURE — 87086 URINE CULTURE/COLONY COUNT: CPT

## 2019-11-19 PROCEDURE — 85730 THROMBOPLASTIN TIME PARTIAL: CPT

## 2019-11-19 PROCEDURE — 87205 SMEAR GRAM STAIN: CPT

## 2019-11-19 PROCEDURE — 87070 CULTURE OTHR SPECIMN AEROBIC: CPT

## 2019-11-19 PROCEDURE — 87077 CULTURE AEROBIC IDENTIFY: CPT

## 2019-11-19 PROCEDURE — 94640 AIRWAY INHALATION TREATMENT: CPT

## 2019-11-19 PROCEDURE — 96361 HYDRATE IV INFUSION ADD-ON: CPT

## 2019-11-19 PROCEDURE — 85610 PROTHROMBIN TIME: CPT

## 2019-11-19 PROCEDURE — 93005 ELECTROCARDIOGRAM TRACING: CPT

## 2019-11-19 PROCEDURE — 94760 N-INVAS EAR/PLS OXIMETRY 1: CPT

## 2019-11-19 PROCEDURE — 87186 SC STD MICRODIL/AGAR DIL: CPT

## 2019-11-19 RX ADMIN — APIXABAN SCH MG: 5 TABLET, FILM COATED ORAL at 22:58

## 2019-11-19 RX ADMIN — SENNOSIDES SCH MG: 8.6 TABLET, FILM COATED ORAL at 22:58

## 2019-11-19 RX ADMIN — CEFAZOLIN SCH MLS/HR: 330 INJECTION, POWDER, FOR SOLUTION INTRAMUSCULAR; INTRAVENOUS at 23:04

## 2019-11-19 RX ADMIN — SERTRALINE HYDROCHLORIDE SCH MG: 100 TABLET ORAL at 22:58

## 2019-11-19 RX ADMIN — NORTRIPTYLINE HYDROCHLORIDE SCH MG: 10 CAPSULE ORAL at 22:58

## 2019-11-19 RX ADMIN — KETOROLAC TROMETHAMINE PRN MG: 30 INJECTION, SOLUTION INTRAMUSCULAR at 22:54

## 2019-11-19 NOTE — ED
General Adult HPI





- General


Chief complaint: Weakness


Stated complaint: NOT FEELING WELL, [facility]


Time Seen by Provider: 11/19/19 15:40


Source: patient, RN notes reviewed, old records reviewed


Mode of arrival: wheelchair


Limitations: no limitations





- History of Present Illness


Initial comments: 





This a 74-year-old female presents emergency Department with past medical 

history significant for metastatic melanoma.  Patient states she started her 

chemotherapy a week and half ago after she had to stop it secondary to side 

effects.  Patient comes in today because of weakness and generally just not 

feeling well.  Patient states she has a dry heaves.  Patient states since 

Saturday she was incontinent of urine with some dysuria and now she is having 

urinary retention.  Patient states this is been ongoing since the earlier today.

 Patient denies any fever chills.  Patient denies any chest pain but states she 

has been short of breath for the last 3 or 4 days.  Patient denies any headache 

patient denies any numbness weakness.  Patient states she does have some lower 

back pain but there is no radiation of the pain down her legs.  Patient denies 

any lightheadedness or dizziness.  Patient states she does have a DVT and is on 

eliquis.





- Related Data


                                Home Medications











 Medication  Instructions  Recorded  Confirmed


 


Meloxicam [Mobic] 7.5 mg PO BID 11/04/19 11/19/19


 


Nortriptyline [Pamelor] 10 mg PO HS 11/04/19 11/19/19


 


Nystatin-Triamcinolone Oint 1 applic TOPICAL BID PRN 11/04/19 11/19/19





[Mycolog 100,000-0.1 Unit/gm-%   





Oint]   


 


Omeprazole 20 mg PO DAILY 11/04/19 11/19/19


 


Prochlorperazine [Compazine] 10 mg PO Q6H PRN 11/04/19 11/19/19


 


Sertraline [Zoloft] 100 mg PO BID 11/04/19 11/19/19


 


Tafinlar 75mg Capsule 150 mg PO Q12H 11/04/19 11/19/19


 


Trametinib Dimethyl Sulfoxide 2 mg PO DAILY 11/04/19 11/19/19





[Mekinist]   


 


amLODIPine BESYLATE [Norvasc] 2.5 mg PO DAILY 11/04/19 11/19/19


 


Apixaban [Eliquis] See Taper PO DAILY 11/19/19 11/19/19


 


HYDROcodone/APAP 5-325MG [Norco 1 tab PO Q4HR PRN 11/19/19 11/19/19





5-325]   








                                  Previous Rx's











 Medication  Instructions  Recorded


 


Sennosides [Senokot] 8.6 mg PO BID 30 Days #60 tab 11/07/19











                                    Allergies











Allergy/AdvReac Type Severity Reaction Status Date / Time


 


ciprofloxacin [From Cipro] Allergy  Hallucinati Verified 11/19/19 15:41





   ons  


 


Penicillins Allergy  Unknown Verified 11/19/19 15:41





   Childhood  














Review of Systems


ROS Statement: 


Those systems with pertinent positive or pertinent negative responses have been 

documented in the HPI.





ROS Other: All systems not noted in ROS Statement are negative.





Past Medical History


Past Medical History: Cancer, Hyperlipidemia, Hypertension, Pneumonia


Additional Past Medical History / Comment(s): metatastic melanoma dx 2018


History of Any Multi-Drug Resistant Organisms: None Reported


Past Surgical History: Bladder Surgery, Hernia Repair, Hysterectomy, Orthopedic 

Surgery


Additional Past Surgical History / Comment(s): tumor removal ight breast


Past Anesthesia/Blood Transfusion Reactions: No Reported Reaction


Past Psychological History: No Psychological Hx Reported


Smoking Status: Never smoker


Past Alcohol Use History: None Reported


Past Drug Use History: None Reported





General Exam





- General Exam Comments


Initial Comments: 





GENERAL:


Patient is well-developed and well-nourished.  Patient is nontoxic and 

well-hydrated and is in mild distress.





ENT:


Neck is soft and supple.  No significant lymphadenopathy is noted.  Oropharynx 

is clear.  Moist mucous membranes.  Neck has full range of motion without 

eliciting any pain.  





EYES:


The sclera were anicteric and conjunctiva were pink and moist.  Extraocular 

movements were intact and pupils were equal round and reactive to light.  

Eyelids were unremarkable.





PULMONARY:


Unlabored respirations.  Good breath sounds bilaterally.  No audible rales 

rhonchi or wheezing was noted.





CARDIOVASCULAR:


There is a regular rate and rhythm without any murmurs gallops or rubs. 





ABDOMEN:


Soft and nontender with normal bowel sounds.  No palpable organomegaly was 

noted.  There is no palpable pulsatile mass.





SKIN:


Skin is clear with no lesions or rashes and otherwise unremarkable.





NEUROLOGIC:


Patient is alert and oriented x3.  Cranial nerves II through XII are grossly 

intact.  Motor and sensory are also intact.  Normal speech, volume and content. 

Symmetrical smile.  





MUSCULOSKELETAL:


Normal extremities with adequate strength and full range of motion.  No lower 

extremity swelling or edema.  No calf tenderness.





LYMPHATICS:


No significant lymphadenopathy is noted





PSYCHIATRIC:


Normal psychiatric evaluation.


Limitations: no limitations





Course


                                   Vital Signs











  11/19/19 11/19/19





  15:39 18:11


 


Temperature 97.7 F 


 


Pulse Rate 108 H 97


 


Respiratory 22 18





Rate  


 


Blood Pressure 113/79 114/76


 


O2 Sat by Pulse 96 94 L





Oximetry  














Medical Decision Making





- Medical Decision Making





Chest x-ray shows left lower lobe pneumonia





I started the patient Rocephin.





I spoke with Dr. Coleman he agreed to admit the patient admitted the patient 

wrote admitting orders.  Consult to Dr. Musa I also spoke with Dr. Musa and he

was in agreement with the admission.





EKG shows normal sinus rhythm at 93 bpm FL interval 250 QRS is 80 QT intervals 

372 QTC is 462.  Patient's EKG shows no ST segment elevation or depression or T-

wave abnormality is noted.





- Lab Data


Result diagrams: 


                                 11/19/19 16:25





                                 11/19/19 16:25


                                   Lab Results











  11/19/19 11/19/19 11/19/19 Range/Units





  16:02 16:25 16:25 


 


WBC   21.6 H   (3.8-10.6)  k/uL


 


RBC   4.99   (3.80-5.40)  m/uL


 


Hgb   14.4   (11.4-16.0)  gm/dL


 


Hct   45.0   (34.0-46.0)  %


 


MCV   90.1   (80.0-100.0)  fL


 


MCH   28.8   (25.0-35.0)  pg


 


MCHC   32.0   (31.0-37.0)  g/dL


 


RDW   15.8 H   (11.5-15.5)  %


 


Plt Count   154   (150-450)  k/uL


 


Neutrophils % (Manual)   59   %


 


Lymphocytes % (Manual)   40   %


 


Monocytes % (Manual)   1   %


 


Neutrophils # (Manual)   12.74 H   (1.3-7.7)  k/uL


 


Lymphocytes # (Manual)   8.64 H   (1.0-4.8)  k/uL


 


Monocytes # (Manual)   0.22   (0-1.0)  k/uL


 


Nucleated RBCs   0   (0-0)  /100 WBC


 


Manual Slide Review   Performed   


 


PT     (9.0-12.0)  sec


 


INR     (<1.2)  


 


APTT     (22.0-30.0)  sec


 


Sodium    136 L  (137-145)  mmol/L


 


Potassium    3.8  (3.5-5.1)  mmol/L


 


Chloride    106  ()  mmol/L


 


Carbon Dioxide    23  (22-30)  mmol/L


 


Anion Gap    7  mmol/L


 


BUN    37 H  (7-17)  mg/dL


 


Creatinine    0.64  (0.52-1.04)  mg/dL


 


Est GFR (CKD-EPI)AfAm    >90  (>60 ml/min/1.73 sqM)  


 


Est GFR (CKD-EPI)NonAf    90  (>60 ml/min/1.73 sqM)  


 


Glucose    91  (74-99)  mg/dL


 


Plasma Lactic Acid Yomi     (0.7-2.0)  mmol/L


 


Calcium    7.9 L  (8.4-10.2)  mg/dL


 


Magnesium    2.2  (1.6-2.3)  mg/dL


 


Total Bilirubin    1.3  (0.2-1.3)  mg/dL


 


AST    441 H  (14-36)  U/L


 


ALT    139 H  (9-52)  U/L


 


Alkaline Phosphatase    148 H  ()  U/L


 


Troponin I     (0.000-0.034)  ng/mL


 


Total Protein    5.4 L  (6.3-8.2)  g/dL


 


Albumin    2.7 L  (3.5-5.0)  g/dL


 


Urine Color  Light Orange    


 


Urine Appearance  Cloudy H    (Clear)  


 


Urine pH  5.5    (5.0-8.0)  


 


Ur Specific Gravity  1.031    (1.001-1.035)  


 


Urine Protein  1+ H    (Negative)  


 


Urine Glucose (UA)  Negative    (Negative)  


 


Urine Ketones  Negative    (Negative)  


 


Urine Blood  Moderate H    (Negative)  


 


Urine Nitrite  Negative    (Negative)  


 


Urine Bilirubin  Negative    (Negative)  


 


Urine Urobilinogen  6.0    (<2.0)  mg/dL


 


Ur Leukocyte Esterase  Small H    (Negative)  


 


Urine RBC  7 H    (0-5)  /hpf


 


Urine WBC  13 H    (0-5)  /hpf


 


Ur Squamous Epith Cells  5 H    (0-4)  /hpf


 


Urine Bacteria  Many H    (None)  /hpf














  11/19/19 11/19/19 11/19/19 Range/Units





  16:25 16:25 16:25 


 


WBC     (3.8-10.6)  k/uL


 


RBC     (3.80-5.40)  m/uL


 


Hgb     (11.4-16.0)  gm/dL


 


Hct     (34.0-46.0)  %


 


MCV     (80.0-100.0)  fL


 


MCH     (25.0-35.0)  pg


 


MCHC     (31.0-37.0)  g/dL


 


RDW     (11.5-15.5)  %


 


Plt Count     (150-450)  k/uL


 


Neutrophils % (Manual)     %


 


Lymphocytes % (Manual)     %


 


Monocytes % (Manual)     %


 


Neutrophils # (Manual)     (1.3-7.7)  k/uL


 


Lymphocytes # (Manual)     (1.0-4.8)  k/uL


 


Monocytes # (Manual)     (0-1.0)  k/uL


 


Nucleated RBCs     (0-0)  /100 WBC


 


Manual Slide Review     


 


PT   11.3   (9.0-12.0)  sec


 


INR   1.1   (<1.2)  


 


APTT   52.1 H   (22.0-30.0)  sec


 


Sodium     (137-145)  mmol/L


 


Potassium     (3.5-5.1)  mmol/L


 


Chloride     ()  mmol/L


 


Carbon Dioxide     (22-30)  mmol/L


 


Anion Gap     mmol/L


 


BUN     (7-17)  mg/dL


 


Creatinine     (0.52-1.04)  mg/dL


 


Est GFR (CKD-EPI)AfAm     (>60 ml/min/1.73 sqM)  


 


Est GFR (CKD-EPI)NonAf     (>60 ml/min/1.73 sqM)  


 


Glucose     (74-99)  mg/dL


 


Plasma Lactic Acid Yomi  1.4    (0.7-2.0)  mmol/L


 


Calcium     (8.4-10.2)  mg/dL


 


Magnesium     (1.6-2.3)  mg/dL


 


Total Bilirubin     (0.2-1.3)  mg/dL


 


AST     (14-36)  U/L


 


ALT     (9-52)  U/L


 


Alkaline Phosphatase     ()  U/L


 


Troponin I    0.014  (0.000-0.034)  ng/mL


 


Total Protein     (6.3-8.2)  g/dL


 


Albumin     (3.5-5.0)  g/dL


 


Urine Color     


 


Urine Appearance     (Clear)  


 


Urine pH     (5.0-8.0)  


 


Ur Specific Gravity     (1.001-1.035)  


 


Urine Protein     (Negative)  


 


Urine Glucose (UA)     (Negative)  


 


Urine Ketones     (Negative)  


 


Urine Blood     (Negative)  


 


Urine Nitrite     (Negative)  


 


Urine Bilirubin     (Negative)  


 


Urine Urobilinogen     (<2.0)  mg/dL


 


Ur Leukocyte Esterase     (Negative)  


 


Urine RBC     (0-5)  /hpf


 


Urine WBC     (0-5)  /hpf


 


Ur Squamous Epith Cells     (0-4)  /hpf


 


Urine Bacteria     (None)  /hpf














Disposition


Clinical Impression: 


 Pneumonia





Disposition: ADMITTED AS IP TO THIS HOSP


Referrals: 


Brian Simpson MD [Primary Care Provider] - 1-2 days


Time of Disposition: 19:26

## 2019-11-19 NOTE — XR
EXAMINATION TYPE: XR chest 2V

 

DATE OF EXAM: 11/19/2019

 

COMPARISON: NONE

 

HISTORY: Weakness

 

TECHNIQUE:  Frontal and lateral views of the chest are obtained.

 

FINDINGS:  Heart is normal. There is right central venous catheter with the tip in the superior vena 
cava. There is some mild linear density left lower lobe. There is no heart failure. There is some abdon
nting of the posterior costophrenic angles.

 

IMPRESSION:  Small pleural effusions. Mild atelectasis and infiltrate left lung base. No heart failur
e seen.

## 2019-11-19 NOTE — P.HPIM
History of Present Illness


H&P Date: 11/19/19


Chief Complaint: Generalized weakness, productive cough, urinary incontinence





74-year-old female with metastatic melanoma, hypertension, recent history of DVT

left lower extremity currently on Eliquis





Patient comes in today with 5 day history of urinary incontinence with worsening

symptoms today she was having urinary retention and worsening low back pain 

described it as sharp pain short lived not related to activity, denies any 

nausea vomiting denies any fever but reports chills.  She reports that the 

symptoms are similar to a recent history of kidney stones that she had.  For 

which she decided come to the hospital today


She also reports generalized weakness and malaise, she was recently diagnosed 

with melanoma 1.5 years ago then was diagnosed with metastasis she is been on 

chemotherapy for the past few weeks off-and-on due to intolerance.  She 

restarted her cycle of chemotherapy one and a half weeks ago.  Since then she's 

been feeling generalized malaise and progressive fatigue.  She reports that over

the weekend she started having productive cough with yellowish greenish sputum, 

and some component of pleuritic chest pain with strong bouts of coughing.  

Patient reports history of recurrent pneumonia in the past.  Patient was 

hospitalized 3 weeks ago for dehydration and intolerance of chemotherapy.  

Patient reports recent diagnosis of left lower extremity DVT for which she is 

taking Eliquis.  Otherwise denies any sick contacts or recent traveling denies 

any recent surgeries.  Patient denies any GI bleeding.














In the ED workup showed elevated white count however she reports that she was on

a tapering dose of steroids since her last discharge from the hospital.  Denies 

any history of COPD.  She is not sure why she was on steroids


She was also found to have elevated liver enzymes, she reports metastasis to the

liver





Chest x-ray showed possible small left lower lobe infiltrates





Urinalysis was contaminated with 5 squamous epithelial cells, showed some 

moderate blood





Review of Systems

















Pertinent positives as noted in HPI. All other systems were reviewed and are 

negative





Past Medical History


Past Medical History: Cancer, Hyperlipidemia, Hypertension, Pneumonia


Additional Past Medical History / Comment(s): metatastic melanoma dx 2018


History of Any Multi-Drug Resistant Organisms: None Reported


Past Surgical History: Bladder Surgery, Hernia Repair, Hysterectomy, Orthopedic 

Surgery


Additional Past Surgical History / Comment(s): tumor removal ight breast


Past Anesthesia/Blood Transfusion Reactions: No Reported Reaction


Past Psychological History: No Psychological Hx Reported


Smoking Status: Never smoker


Past Alcohol Use History: None Reported


Past Drug Use History: None Reported





- Past Family History


  ** Family


Additional Family Medical History / Comment(s): Denies history of cancer





Medications and Allergies


                                Home Medications











 Medication  Instructions  Recorded  Confirmed  Type


 


Meloxicam [Mobic] 7.5 mg PO BID 11/04/19 11/19/19 History


 


Nortriptyline [Pamelor] 10 mg PO HS 11/04/19 11/19/19 History


 


Nystatin-Triamcinolone Oint 1 applic TOPICAL BID PRN 11/04/19 11/19/19 History





[Mycolog 100,000-0.1 Unit/gm-%    





Oint]    


 


Omeprazole 20 mg PO DAILY 11/04/19 11/19/19 History


 


Prochlorperazine [Compazine] 10 mg PO Q6H PRN 11/04/19 11/19/19 History


 


Sertraline [Zoloft] 100 mg PO BID 11/04/19 11/19/19 History


 


Tafinlar 75mg Capsule 150 mg PO Q12H 11/04/19 11/19/19 History


 


Trametinib Dimethyl Sulfoxide 2 mg PO DAILY 11/04/19 11/19/19 History





[Mekinist]    


 


amLODIPine BESYLATE [Norvasc] 2.5 mg PO DAILY 11/04/19 11/19/19 History


 


Sennosides [Senokot] 8.6 mg PO BID 30 Days #60 tab 11/07/19 11/19/19 Rx


 


Apixaban [Eliquis] See Taper PO DAILY 11/19/19 11/19/19 History


 


HYDROcodone/APAP 5-325MG [Norco 1 tab PO Q4HR PRN 11/19/19 11/19/19 History





5-325]    








                                    Allergies











Allergy/AdvReac Type Severity Reaction Status Date / Time


 


ciprofloxacin [From Cipro] Allergy  Hallucinati Verified 11/19/19 15:41





   ons  


 


Penicillins Allergy  Unknown Verified 11/19/19 15:41





   Childhood  














Physical Exam


Vitals: 


                                   Vital Signs











  Temp Pulse Resp BP Pulse Ox


 


 11/19/19 18:11   97  18  114/76  94 L


 


 11/19/19 15:39  97.7 F  108 H  22  113/79  96








                                Intake and Output











 11/19/19 11/19/19 11/19/19





 06:59 14:59 22:59


 


Output Total   58


 


Balance   -58


 


Output:   


 


  Post Void Residual   58


 


Other:   


 


  Weight   75.296 kg














Constitutional:          No acute distress, conversant, pleasant


Eyes:      Anicteric sclerae, moist conjunctiva, no lid-lag


         Pupils equal round reactive to light





ENMT:      NC/AT


         Oropharynx clear, no erythema, exudates





Neck:      Supple, FROM, no masses, or JVD


         No carotid bruits


         No thyromegaly





Lungs:      Clear to auscultation


         Clear to percussion


         Normal respiratory effort, no accessory muscle use 





Cardiovascular:      Heart regular in rate and rhythm, 


         No murmurs, gallops, or rubs


         No peripheral edema





Abdominal:       Soft, tenderness upon percussion of the left costovertebral 

angle


         Nontender, no guarding, rebound or rigidity


         Abdomen moving with respiration


         Normoactive bowel sounds


         No hepatomegaly, No splenomegaly


         No palpable mass 


         No abdominal wall hernia noted 





Skin:      Healthy scar from prior surgery over the back between shoulders 

blades 


         Normal temperature, tone, texture, turgor


         No induration


         No subcutaneous nodules 


         No rash, lesions


         No ulcers





Extremities:      No digital cyanosis 


         No clubbing


         Pedal pulses intact and symmetrical


         Radial pulses intact and symmetrical 


         No calf tenderness 





Psychiatric:      Alert and oriented to person, place and time


         Appropriate affect


         fair judgement   


      


Neuro      Muscles Strength 5/5 in all 4 extremities 


         Sensation to light touch grossly present throughout


         Cranial nerves II-XII grossly intact


         No focal sensory deficits


Lymphatics:       no palpable cervical or supraclavicular , or inguinal lymph 

nodes  





Results


CBC & Chem 7: 


                                 11/19/19 16:25





                                 11/19/19 16:25


Labs: 


                  Abnormal Lab Results - Last 24 Hours (Table)











  11/19/19 11/19/19 11/19/19 Range/Units





  16:02 16:25 16:25 


 


WBC   21.6 H   (3.8-10.6)  k/uL


 


RDW   15.8 H   (11.5-15.5)  %


 


Neutrophils # (Manual)   12.74 H   (1.3-7.7)  k/uL


 


Lymphocytes # (Manual)   8.64 H   (1.0-4.8)  k/uL


 


APTT     (22.0-30.0)  sec


 


Sodium    136 L  (137-145)  mmol/L


 


BUN    37 H  (7-17)  mg/dL


 


Calcium    7.9 L  (8.4-10.2)  mg/dL


 


AST    441 H  (14-36)  U/L


 


ALT    139 H  (9-52)  U/L


 


Alkaline Phosphatase    148 H  ()  U/L


 


Total Protein    5.4 L  (6.3-8.2)  g/dL


 


Albumin    2.7 L  (3.5-5.0)  g/dL


 


Urine Appearance  Cloudy H    (Clear)  


 


Urine Protein  1+ H    (Negative)  


 


Urine Blood  Moderate H    (Negative)  


 


Ur Leukocyte Esterase  Small H    (Negative)  


 


Urine RBC  7 H    (0-5)  /hpf


 


Urine WBC  13 H    (0-5)  /hpf


 


Ur Squamous Epith Cells  5 H    (0-4)  /hpf


 


Urine Bacteria  Many H    (None)  /hpf














  11/19/19 Range/Units





  16:25 


 


WBC   (3.8-10.6)  k/uL


 


RDW   (11.5-15.5)  %


 


Neutrophils # (Manual)   (1.3-7.7)  k/uL


 


Lymphocytes # (Manual)   (1.0-4.8)  k/uL


 


APTT  52.1 H  (22.0-30.0)  sec


 


Sodium   (137-145)  mmol/L


 


BUN   (7-17)  mg/dL


 


Calcium   (8.4-10.2)  mg/dL


 


AST   (14-36)  U/L


 


ALT   (9-52)  U/L


 


Alkaline Phosphatase   ()  U/L


 


Total Protein   (6.3-8.2)  g/dL


 


Albumin   (3.5-5.0)  g/dL


 


Urine Appearance   (Clear)  


 


Urine Protein   (Negative)  


 


Urine Blood   (Negative)  


 


Ur Leukocyte Esterase   (Negative)  


 


Urine RBC   (0-5)  /hpf


 


Urine WBC   (0-5)  /hpf


 


Ur Squamous Epith Cells   (0-4)  /hpf


 


Urine Bacteria   (None)  /hpf














Assessment and Plan


Assessment: 





74-year-old female with active metastatic melanoma, currently on chemotherapy.  

Presented due to productive cough and urinary incontinence then retention 

concerning for UTI versus kidney stones.  Patient admitted as an inpatient with 

anticipated length of stay more than 2 midnights for treatment of healthcare 

associated pneumonia, await computed tomography scan of the abdomen without 

contrast to rule out any kidney stones.  Supportive care and IV fluid hydration.


Plan: 





Sepsis secondary to underlying healthcare associated pneumonia


Healthcare associated pneumonia


Follow-up cultures


Empiric antibiotics with Rocephin and azithromycin


Supportive care


IV fluid hydration


Patient has elevated white count however she does report that she was up until 

recently on a tapering dose of steroid but she doesn't know what was the exact 

indication





Urinary  retention, rule out passing kidney stones


Check CT of the abdomen


IV fluid hydration


Pain control with Toradol and opiates





Chronic conditions


Metastatic melanoma


Elevated liver enzymes secondary to metastasis to the liver


Moderate protein calorie malnutrition


Hypertension


Hyperlipidemia


Recent diagnosis of left lower extremity DVT


Continue home meds





Preformed a thorough record review from recent hospitalization she was recently 

hospitalized 3 weeks ago for generalized malaise and weakness after initiating 

chemotherapy





GI prophylaxis PPIs


CODE STATUS: Full code


DVT prophylaxis: Patient on Eliquis


Discussed with: Patient, ER


Anticipated length of stay  more than 2 midnights


Anticipated discharge place: home


A total of  60 minutes was spent on the care of this complex patient more than 

50% of the time was spent in counseling and care coordination.

## 2019-11-20 LAB
ALBUMIN SERPL-MCNC: 2.2 G/DL (ref 3.5–5)
ALP SERPL-CCNC: 132 U/L (ref 38–126)
ALT SERPL-CCNC: 130 U/L (ref 9–52)
ANION GAP SERPL CALC-SCNC: 3 MMOL/L
AST SERPL-CCNC: 337 U/L (ref 14–36)
BASOPHILS # BLD AUTO: 0 K/UL (ref 0–0.2)
BASOPHILS NFR BLD AUTO: 0 %
BUN SERPL-SCNC: 34 MG/DL (ref 7–17)
CALCIUM SPEC-MCNC: 7.4 MG/DL (ref 8.4–10.2)
CHLORIDE SERPL-SCNC: 107 MMOL/L (ref 98–107)
CO2 SERPL-SCNC: 26 MMOL/L (ref 22–30)
EOSINOPHIL # BLD AUTO: 0 K/UL (ref 0–0.7)
EOSINOPHIL NFR BLD AUTO: 0 %
ERYTHROCYTE [DISTWIDTH] IN BLOOD BY AUTOMATED COUNT: 4.32 M/UL (ref 3.8–5.4)
ERYTHROCYTE [DISTWIDTH] IN BLOOD: 15.8 % (ref 11.5–15.5)
GLUCOSE SERPL-MCNC: 100 MG/DL (ref 74–99)
HCT VFR BLD AUTO: 39.2 % (ref 34–46)
HGB BLD-MCNC: 12.2 GM/DL (ref 11.4–16)
LYMPHOCYTES # SPEC AUTO: 4 K/UL (ref 1–4.8)
LYMPHOCYTES NFR SPEC AUTO: 23 %
MCH RBC QN AUTO: 28.2 PG (ref 25–35)
MCHC RBC AUTO-ENTMCNC: 31 G/DL (ref 31–37)
MCV RBC AUTO: 90.9 FL (ref 80–100)
MONOCYTES # BLD AUTO: 0.9 K/UL (ref 0–1)
MONOCYTES NFR BLD AUTO: 5 %
NEUTROPHILS # BLD AUTO: 12.6 K/UL (ref 1.3–7.7)
NEUTROPHILS NFR BLD AUTO: 71 %
PLATELET # BLD AUTO: 138 K/UL (ref 150–450)
POTASSIUM SERPL-SCNC: 4 MMOL/L (ref 3.5–5.1)
PROT SERPL-MCNC: 4.5 G/DL (ref 6.3–8.2)
SODIUM SERPL-SCNC: 136 MMOL/L (ref 137–145)
WBC # BLD AUTO: 17.8 K/UL (ref 3.8–10.6)

## 2019-11-20 RX ADMIN — AZITHROMYCIN SCH MG: 500 TABLET, FILM COATED ORAL at 18:03

## 2019-11-20 RX ADMIN — KETOROLAC TROMETHAMINE PRN MG: 30 INJECTION, SOLUTION INTRAMUSCULAR at 05:01

## 2019-11-20 RX ADMIN — MORPHINE SULFATE PRN MG: 2 INJECTION, SOLUTION INTRAMUSCULAR; INTRAVENOUS at 09:28

## 2019-11-20 RX ADMIN — KETOROLAC TROMETHAMINE PRN MG: 30 INJECTION, SOLUTION INTRAMUSCULAR at 13:36

## 2019-11-20 RX ADMIN — IPRATROPIUM BROMIDE AND ALBUTEROL SULFATE SCH ML: .5; 3 SOLUTION RESPIRATORY (INHALATION) at 20:59

## 2019-11-20 RX ADMIN — SENNOSIDES SCH MG: 8.6 TABLET, FILM COATED ORAL at 09:23

## 2019-11-20 RX ADMIN — KETOROLAC TROMETHAMINE PRN MG: 30 INJECTION, SOLUTION INTRAMUSCULAR at 21:06

## 2019-11-20 RX ADMIN — NORTRIPTYLINE HYDROCHLORIDE SCH MG: 10 CAPSULE ORAL at 21:05

## 2019-11-20 RX ADMIN — CEFAZOLIN SCH: 330 INJECTION, POWDER, FOR SOLUTION INTRAMUSCULAR; INTRAVENOUS at 17:40

## 2019-11-20 RX ADMIN — APIXABAN SCH MG: 5 TABLET, FILM COATED ORAL at 09:23

## 2019-11-20 RX ADMIN — CEFAZOLIN SCH MLS/HR: 330 INJECTION, POWDER, FOR SOLUTION INTRAMUSCULAR; INTRAVENOUS at 09:23

## 2019-11-20 RX ADMIN — PROCHLORPERAZINE MALEATE PRN MG: 10 TABLET, FILM COATED ORAL at 14:14

## 2019-11-20 RX ADMIN — APIXABAN SCH MG: 5 TABLET, FILM COATED ORAL at 21:04

## 2019-11-20 RX ADMIN — PANTOPRAZOLE SODIUM SCH MG: 40 TABLET, DELAYED RELEASE ORAL at 09:23

## 2019-11-20 RX ADMIN — SERTRALINE HYDROCHLORIDE SCH MG: 100 TABLET ORAL at 09:23

## 2019-11-20 RX ADMIN — SERTRALINE HYDROCHLORIDE SCH MG: 100 TABLET ORAL at 21:04

## 2019-11-20 RX ADMIN — SENNOSIDES SCH MG: 8.6 TABLET, FILM COATED ORAL at 21:03

## 2019-11-20 NOTE — CONS
CONSULTATION



This is a 74-year-old female with a past medical history positive for metastatic

melanoma.  She has lesions from her melanoma in her kidneys, liver, spleen, and lung.

She apparently started chemotherapy more than a year ago with Opdivo.  Apparently that

did not do the job and she was started on a new chemotherapeutic agents including

Tafinlar and Mekinist. She apparently started these chemo agents recently.  She had her

first treatment about a month ago and became sick and apparently was admitted to the

hospital.  She became very weak.  She was found to have a DVT in the left leg at that

time.  Apparently, the drugs were withdrawn and she was started on lower doses of both

drugs and she is back in the hospital right now, not feeling well.  The patient

apparently complained of weakness and just not feeling herself.  She really could not

be more specific.  She did have some nausea and dry heaves.  She was apparently having

some urinary incontinence and burning on urination, was thought to have a possible

urinary tract infection.  She did have some mild shortness of breath.  She denies any

chest pain or chest discomfort.  The patient is currently in the hospital, resting

comfortably.  We saw her today in consultation.



CURRENT MEDICATIONS:

Include Mobic, Pamelor, nystatin, triamcinolone ointment, omeprazole, Compazine,

Zoloft, Tafinlar, Mekinist, amlodipine, Eliquis, Norco and Senokot.



ALLERGIES:

CIPROFLOXACIN and PENICILLIN.



PAST MEDICAL HISTORY:

Positive for metastatic melanoma, hyperlipidemia, hypertension, and pneumonia.  The

melanoma was diagnosed in 2018.  She has got metastatic disease to the liver, kidneys,

spleen, and lung.



SURGICAL HISTORY:

Includes bladder surgery, hernia repair, hysterectomy, tumor removed from the right

breast, and some orthopedic procedures.



SOCIAL HISTORY:

Significant that she is a lifelong nonsmoker.  Denies any alcohol or illicit drug use.



FAMILY HISTORY:

Noncontributory.  Mother and father apparently reasonably healthy.



REVIEW OF SYSTEMS:

CONSTITUTIONAL:  Weakness, decreased appetite.

NEUROLOGIC:  Negative.

HEENT: Negative.

CARDIOVASCULAR:  Negative.

PULMONARY:  Mild shortness of breath.

GI:  Nausea, vomiting.

:  Burning on urination, pain on urination,.

RHEUMATOLOGIC:  Negative.

IMMUNOLOGIC:  Negative.

ENDOCRINOLOGIC:  Negative.

DERMATOLOGIC:  Negative.





Current vital signs are reviewed, temperature is 98, heart rate is 85, respiratory rate

16, blood pressure 136/89 mean 104 and 2 L saturation 95%.  Appears in no acute

distress.  Mild conversational dyspnea.  No audible wheezing.  No use of accessory

muscles.

HEENT: Examination is grossly unremarkable.  Mucous membranes are moist.  No oral

lesions.

NECK:  Supple.  Full range of motion.  No adenopathy or thyromegaly.  Neck veins are

flat.

CARDIOVASCULAR:  Examination reveals regular rhythm and rate.  Heart rate 85.  It is

regular.  S1, S2 normal.  No murmur.

LUNGS:  A few scattered mild rhonchi.  No wheezes or crackles.  Breath sounds are

pretty equal bilaterally.

ABDOMEN:  Soft bowel sounds are heard.

EXTREMITIES::  Intact.  No cyanosis, clubbing, or edema.

SKIN: Without rash.

NEUROLOGIC: Examination is brief but nonfocal.  She does have a large incision on the

back from melanoma surgery.



LABS:

Reviewed.  White count 17.8, hemoglobin 12.2, hematocrit 39.2, platelet count 138,000

PT, INR normal.  PTT 52.1.  Sodium 136, potassium 4, chloride 107, CO2 is 26. BUN and

creatinine were 34 and 0.65.  , , alk phosphatase 132, albumin 2.2.

Urine is light orange and cloudy, 1+ protein, moderate blood, small leukocyte esterase,

7 RBCs, 13 WBCs and many bacteria.

A chest.



Chest x-ray on the day of admission admits to some infiltrate or and/or atelectasis at

the left lung base.



A repeat chest x-ray on the 20th again shows minimal infiltrate or atelectasis at the

left lung base.  In my opinion, the chest x-ray is slightly improved.



Abdominal and pelvic CT shows soft new splenomegaly with metastatic disease.  Lung,

liver and spleen.  There also may be adrenal metastasis.

Current medications are reviewed.  They seem relatively appropriate.  She is getting

both Zithromax and Rocephin for her urinary tract infection and lung possible

pneumonia.  The rest of her medications look appropriate.



ASSESSMENT:

1. Urinary tract infection/urosepsis.

2. Probable pneumonia left lower lobe.

3. Metastatic melanoma with lesions in the lung, liver, spleen, kidneys, and adrenal

    glands.

4. Recent diagnosis of left lower extremity deep venous thrombosis.

5. Hyperlipidemia.

6. Hypertension.

7. History of pneumonia.



PLAN:

The patient's overall prognosis remains guarded.  She is on appropriate medications.

Will continue to follow.  Chest x-ray is actually improved.  Clinically, she does not

look bad.  She does admit to very mild shortness of breath.  Additional recommendations

and suggestions are forthcoming.  She should have urine and blood cultures.  Will

continue to follow.





MMSALLIEL / IJN: [id number] / Job#: [id number]

## 2019-11-20 NOTE — P.CONS
History of Present Illness





- Reason for Consult


Consult date: 11/20/19


met melanoma


Requesting physician: Naeem Alvarez





- Chief Complaint


oliguria, weakness





- History of Present Illness





Patient was seen in the office yesterday after 1 week of resuming just tafinlar,

mekinist dose was reduced and being held for at least 1-2 weeks.  She had 

complaints of oliguria, dysuria, incontinence of urine, progressive, generalized

weakness and poor oral intake.  Offered some outpatient treatment options but 

ultimately it was decided in patient's best interest that she be sent to the 

emergency room.  Since admission patient has been receiving IV fluids, 

antibiotics with improvement in some of her symptoms, she is requesting some 

better pain control.  Her pain seems to be mostly around the abdomen in the 

back, rather persistent, not progressive, aching, positioning doesn't help a 

little bit, when the pain is controlled she can move around better.  She has no 

other physical complaints at this time on a 10 point review of systems





Past medical/malignancy history:  Found to have a suspicious lesion on the right

upper back and underwent excision on 10/11/18. she had first noticed a "growth" 

around 12/17.  This had slowly grown in size, and by 9/18 was causing symptoms 

like increasing and mild discomfort.  This revealed malignant melanoma, 16 mm 

with tumor extending to the deep margin.  Tumor was 1.9 cm, with 

mitosis/millimeter square, with ulceration.  The patient was referred to the 

[facility], and had wide excision with sentinel node biopsy on 

12/6/18.  Section and nodes were localize to the right axilla, and 3 of 3 were 

involved.  She had staging workup on 12/18/18 with CT of the chest abdomen and 

pelvis and MRI of the brain revealing no distant metastasis.  Scattered 

subcentimeter nodules was seen in the lung, along with conglomerate right 

axillary mass measuring 5.9 x 4.1 cm and left axillary albertina mass measuring 3.7 

x 2.6 cm.  The patient then underwent bilateral axillary node dissection on 

1/2/19.  On the right melanoma was noted in 1/12 lymph nodes with no extranodal 

extension.  On the left 9 lymph nodes were negative.  Her tumor was BRAF V600 

positive.  Seen by oncology at the [facility].  She was staged as 

stage IIIc, pT4 b N 3b. Adjuvant therapy was recommended, with Nivolumab preferr

ed, versus Dabrafenib + Trametinib combination based on side effect profile.  

She desired Tx closer to home so, she was seen by Dr. Arellano.  Opdivo was planned, 

but delayed as the pt could not get her restaging CT scans and brain MRI done 

till 3/28/19. She was supposed to start on 4/5/19.  CT scans however showed subq

nodules  in the rt posterior chest wall area 1 x 0.7 and 1 x 0.8. A 4.9 cm 

density was noted in the rt axilla and a 2.4 cm density in the left axilla.  PET

confirmed a 9 mm subcutaneous mass just inferior and lateral to the right 

scapula, with SUV of 3.71.  Another mass was noted in the right upper back along

the superior aspect of the scapula, about 1 cm, which was suspicious in 

appearance but ametabolic.  She was seen back at [facility], case d/w Dr Harrison, 

recommended she proceed with Opdivo, and be reassessed for possible resection 

down the line depending on response.  Started opdivo on 5/17/19 s/p 7 cycles.  

She has noted an increase in size of the mass overlying the rt scapula, with 

mild soreness. She has also felt a new nodule on her RUE, MRI repeated on 8/8/19

as the pt was c/o dizziness, showed stable white matter changes.  On clinical 

exam, the right upper back mass seemed to show progressive increase in size.  

PET 8/17/19, revealed increase in size of the right upper back mass with 

elevated SUV, right midback lesion larger in size at 1.8 cm, also PET positive, 

bilateral lung nodules were noted, some new, with at least some showing increase

in size from before.  Lung nodules are however not PET avid.  3.5 cm 

indeterminate lesion was seen in the spleen, as well as a questionable focus of 

uptake in the central portion of the liver.  Case was d/w [facility], and it was 

decided to continue for 2 more cycles, to r/o pseudoprogression.  CT 10/16/19 

however, confirms progression with multiple bilateral lung nodules at least 20, 

new subcutaneous nodules, as well as metastatic lesions now seen in the liver, 

bilateral adrenal glands, and spleen.  Started on Tafinlar-Mekinist, on regimen 

for just about a week when seen in the office on 11/4/19.  She had multiple 

complaints, including subjective fevers, increased shortness of breath, 

increasing generalized weakness as well as intermittent nausea and occasional 

vomiting.  She was noted to have lower extremity swelling, doppler and CTA done,

left lower extremity DVT extending from the popliteal into the calf veins, CTA 

was negative for PE, but did show bilateral ground glass opacities suspicious 

for pneumonitis, in addition to the metastatic lung nodules.  The scan also 

mention progression of adrenal metastasis and liver metastasis but was compared 

to the PET scan done in 8/19 and not the most recent CT scan from 10/19. In the 

office, liver enzymes also showed near doubling into the 200 range, compared to 

 on 10/16/19.  





Review of Systems





14 point review of systems is negative except as stated in HPI





Past Medical History


Past Medical History: Cancer, Deep Vein Thrombosis (DVT), Hyperlipidemia, 

Hypertension, Pneumonia, Rheumatoid Arthritis (RA)


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 

spleen, kidney, back, lung , presently DVT left leg


History of Any Multi-Drug Resistant Organisms: None Reported


Past Surgical History: Bladder Surgery, Hernia Repair, Hysterectomy, Orthopedic 

Surgery


Additional Past Surgical History / Comment(s): tumor removal right breast, 3 

knee replacements, T& A, tropean eye surgery


Past Anesthesia/Blood Transfusion Reactions: No Reported Reaction


Past Psychological History: No Psychological Hx Reported


Smoking Status: Never smoker


Past Alcohol Use History: None Reported


Past Drug Use History: None Reported





- Past Family History


  ** Father


Family Medical History: Cancer


Additional Family Medical History / Comment(s): lung cancer





  ** Mother


Family Medical History: Cancer


Additional Family Medical History / Comment(s): intestinal cancer





  ** Sister(s)


Family Medical History: Cancer


Additional Family Medical History / Comment(s): breast cancer





  ** Family


Additional Family Medical History / Comment(s): Denies history of cancer





Medications and Allergies


                                Home Medications











 Medication  Instructions  Recorded  Confirmed  Type


 


Meloxicam [Mobic] 7.5 mg PO BID 11/04/19 11/19/19 History


 


Nortriptyline [Pamelor] 10 mg PO HS 11/04/19 11/19/19 History


 


Nystatin-Triamcinolone Oint 1 applic TOPICAL BID PRN 11/04/19 11/19/19 History





[Mycolog 100,000-0.1 Unit/gm-%    





Oint]    


 


Omeprazole 20 mg PO DAILY 11/04/19 11/19/19 History


 


Prochlorperazine [Compazine] 10 mg PO Q6H PRN 11/04/19 11/19/19 History


 


Sertraline [Zoloft] 100 mg PO BID 11/04/19 11/19/19 History


 


Tafinlar 75mg Capsule 150 mg PO Q12H 11/04/19 11/19/19 History


 


Trametinib Dimethyl Sulfoxide 2 mg PO DAILY 11/04/19 11/19/19 History





[Mekinist]    


 


amLODIPine BESYLATE [Norvasc] 2.5 mg PO DAILY 11/04/19 11/19/19 History


 


Sennosides [Senokot] 8.6 mg PO BID 30 Days #60 tab 11/07/19 11/19/19 Rx


 


Apixaban [Eliquis] 5 mg PO BID 11/19/19 11/19/19 History


 


HYDROcodone/APAP 5-325MG [Norco 1 tab PO Q4HR PRN 11/19/19 11/19/19 History





5-325]    








                                    Allergies











Allergy/AdvReac Type Severity Reaction Status Date / Time


 


ciprofloxacin [From Cipro] Allergy  Hallucinati Verified 11/19/19 15:41





   ons  


 


Penicillins Allergy  Unknown Verified 11/19/19 15:41





   Childhood  














Physical Exam


Vitals: 


                                   Vital Signs











  Temp Pulse Pulse Resp BP BP Pulse Ox


 


 11/20/19 16:31   94     


 


 11/20/19 16:25   90     


 


 11/20/19 13:30    98     95


 


 11/20/19 13:25    136 H  24    86 L


 


 11/20/19 11:58  97.8 F   91  18   137/81  91 L


 


 11/20/19 05:00  98.0 F   85  16   136/89  95


 


 11/20/19 00:00     18   


 


 11/19/19 21:32  97.7 F   91  16   159/91  93 L


 


 11/19/19 21:02   78   16  121/84   96








                                Intake and Output











 11/20/19 11/20/19 11/20/19





 06:59 14:59 22:59


 


Intake Total 1390 800 


 


Balance 1390 800 


 


Intake:   


 


  Intake, IV Titration 800 800 





  Amount   


 


    Sodium Chloride 0.9% 1, 800 750 





    000 ml @ 100 mls/hr IV .   





    Q10H [facility] Rx#:[id number]   


 


    cefTRIAXone 2 gm In  50 





    Sodium Chloride 0.9% 50   





    ml @ 100 mls/hr IVPB ONCE   





    [facility] Rx#:[id number]   


 


  Oral 590  


 


Other:   


 


  Voiding Method Incontinent Incontinent Incontinent


 


  # Voids 2  


 


  Weight 78.517 kg  














- Constitutional


General appearance: average body habitus, cooperative, no acute distress





- EENT


Eyes: anicteric sclerae, EOMI


ENT: hearing grossly normal, normal oropharynx





- Neck


Neck: lymphadenopathy





- Respiratory


Respiratory: bilateral: CTA, diminished, wheezing (few scattered)





- Cardiovascular


Rhythm: regular


Heart sounds: normal: S1, S2


Abnormal Heart Sounds: systolic murmur, no diastolic murmur, no rub, no S3 

Gallop, no S4 Gallop, no click, no other


  ** foot


Peripheral Edema: bilateral: 1+





- Gastrointestinal


General gastrointestinal: no absent bowel sounds, no decreased bowel sounds, no 

distended, no hepatomegaly, no hyperactive bowel sounds, normal bowel sounds, no

organomegaly, no rigid, no scaphoid, soft, no splenomegaly, no tenderness, no 

umbilical hernia, no ventral hernia





- Integumentary


Integumentary: pale





- Neurologic


Neurologic: CNII-XII intact





- Musculoskeletal


Musculoskeletal: strength equal bilaterally





- Psychiatric


Psychiatric: A&O x's 3, appropriate affect, intact judgment & insight





Results


CBC & Chem 7: 


                                 11/20/19 06:54





                                 11/20/19 06:54


Labs: 


                  Abnormal Lab Results - Last 24 Hours (Table)











  11/20/19 11/20/19 Range/Units





  06:54 06:54 


 


WBC  17.8 H   (3.8-10.6)  k/uL


 


RDW  15.8 H   (11.5-15.5)  %


 


Plt Count  138 L   (150-450)  k/uL


 


Neutrophils #  12.6 H   (1.3-7.7)  k/uL


 


Sodium   136 L  (137-145)  mmol/L


 


BUN   34 H  (7-17)  mg/dL


 


Glucose   100 H  (74-99)  mg/dL


 


Calcium   7.4 L  (8.4-10.2)  mg/dL


 


AST   337 H  (14-36)  U/L


 


ALT   130 H  (9-52)  U/L


 


Alkaline Phosphatase   132 H  ()  U/L


 


Total Protein   4.5 L  (6.3-8.2)  g/dL


 


Albumin   2.2 L  (3.5-5.0)  g/dL








                      Microbiology - Last 24 Hours (Table)











 11/19/19 16:02 Urine Culture - Preliminary





 Urine,Voided 











CT scan - abdomen: report reviewed


CT scan - pelvis: report reviewed





Assessment and Plan


(1) UTI (urinary tract infection)


Narrative/Plan: 


Cultures pending, suspected based on patient's presenting symptoms, antibiotics 

are ordered.  Patient does have improved symptoms.


Current Visit: Yes   Status: Acute   Priority: Medium   Code(s): N39.0 - URINARY

TRACT INFECTION, SITE NOT SPECIFIED   SNOMED Code(s): 99172284


   





(2) Acute DVT (deep venous thrombosis)


Narrative/Plan: 


continue anticoagulants as prescribed


Current Visit: No   Status: Acute   Priority: High   Code(s): I82.409 - ACUTE 

EMBOLISM AND THOMBOS UNSP DEEP VN UNSP LOWER EXTREMITY   SNOMED Code(s): 

918672554442932


   





(3) Metastatic melanoma


Narrative/Plan: 


Reviewed with patient the concerning findings on the CT scan.  There is evidence

of progression.  Unfortunately, patient has not even been able to tolerate her 

current regimen for even a week so,  it has not even had a chance to work!  Case

was discussed with Dr. Musa as well as Dr. Arellano.  Discussed options for dose 

reduction (there are actually 2 more dose reductions possible on her current 

regimen) or changing to another treatment regimen that is available.  I spoke 

with patient and she expressed that she would like to try reduced dose of her 

current regimen.  This will be ordered for her.  She will hold treatment until 

new dose received.


Current Visit: No   Status: Chronic   Priority: High   Code(s): C79.9 - 

SECONDARY MALIGNANT NEOPLASM OF UNSPECIFIED SITE   SNOMED Code(s): 124374430


   





(4) Cancer related pain


Narrative/Plan: 


Will work with patient's medications to get her on a long-acting and a short 

acting for breakthrough pain.





Medications for prevention of narcotic-induced constipation.


Current Visit: Yes   Status: Acute   Priority: High   Code(s): G89.3 - NEOPLASM 

RELATED PAIN (ACUTE) (CHRONIC)   SNOMED Code(s): 28552186138064


   


Plan: 





I highly recommended palliative care to the patient-symptom management during 

active treatment.  Patient agreed with plan.  Case discussed with

## 2019-11-20 NOTE — P.PN
Subjective


Progress Note Date: 11/20/19


Principal diagnosis: 





Sepsis





Patient was seen and examined. No acute events overnight. Patient reports 

shortness of breath, similar to yesterday. She denies any chest pain or palpi

tations. No nausea or vomiting, no fever or chills.





Objective





- Vital Signs


Vital signs: 


                                   Vital Signs











Temp  97.8 F   11/20/19 11:58


 


Pulse  94   11/20/19 16:31


 


Resp  24   11/20/19 13:25


 


BP  137/81   11/20/19 11:58


 


Pulse Ox  95   11/20/19 13:30








                                 Intake & Output











 11/19/19 11/20/19 11/20/19





 18:59 06:59 18:59


 


Intake Total  1390 800


 


Output Total 58  


 


Balance -58 1390 800


 


Weight 75.296 kg 78.517 kg 


 


Intake:   


 


  Intake, IV Titration  800 800





  Amount   


 


    Sodium Chloride 0.9% 1,  800 750





    000 ml @ 100 mls/hr IV .   





    Q10H [facility] Rx#:[id number]   


 


    cefTRIAXone 2 gm In   50





    Sodium Chloride 0.9% 50   





    ml @ 100 mls/hr IVPB ONCE   





    [facility] Rx#:[id number]   


 


  Oral  590 


 


Output:   


 


  Post Void Residual 58  


 


Other:   


 


  Voiding Method  Incontinent Incontinent


 


  # Voids  2 














- Exam





General: [non toxic], [no distress], [appears at stated age]


Derm: [warm], [dry]


Head: [atraumatic], [normocephalic], [symmetric]


Eyes: [EOMI], [no lid lag], [anicteric sclera]


Mouth: [no lip lesion], [mucus membranes moist]


Cardiovascular: [S1S2 reg], [no murmur], [positive posterior tibial pulse 

bilateral], 


Lungs: [decreased breath sounds bilateral], [no rhonchi, no rales] , [no 

accessory muscle use]


Abdominal: [soft], [ nontender to palpation], [no guarding], [no appreciable 

organomegaly]


Ext: [no gross muscle atrophy], [no edema], [no contractures]


Neuro:  [no focal neuro deficits]


Psych: [Alert], [oriented], [appropriate affect] 





- Labs


CBC & Chem 7: 


                                 11/20/19 06:54





                                 11/20/19 06:54


Labs: 


                  Abnormal Lab Results - Last 24 Hours (Table)











  11/19/19 11/19/19 11/20/19 Range/Units





  16:25 16:25 06:54 


 


WBC    17.8 H  (3.8-10.6)  k/uL


 


RDW    15.8 H  (11.5-15.5)  %


 


Plt Count    138 L  (150-450)  k/uL


 


Neutrophils #    12.6 H  (1.3-7.7)  k/uL


 


Neutrophils # (Manual)  12.74 H    (1.3-7.7)  k/uL


 


Lymphocytes # (Manual)  8.64 H    (1.0-4.8)  k/uL


 


Sodium   136 L   (137-145)  mmol/L


 


BUN   37 H   (7-17)  mg/dL


 


Glucose     (74-99)  mg/dL


 


Calcium   7.9 L   (8.4-10.2)  mg/dL


 


AST   441 H   (14-36)  U/L


 


ALT   139 H   (9-52)  U/L


 


Alkaline Phosphatase   148 H   ()  U/L


 


Total Protein   5.4 L   (6.3-8.2)  g/dL


 


Albumin   2.7 L   (3.5-5.0)  g/dL














  11/20/19 Range/Units





  06:54 


 


WBC   (3.8-10.6)  k/uL


 


RDW   (11.5-15.5)  %


 


Plt Count   (150-450)  k/uL


 


Neutrophils #   (1.3-7.7)  k/uL


 


Neutrophils # (Manual)   (1.3-7.7)  k/uL


 


Lymphocytes # (Manual)   (1.0-4.8)  k/uL


 


Sodium  136 L  (137-145)  mmol/L


 


BUN  34 H  (7-17)  mg/dL


 


Glucose  100 H  (74-99)  mg/dL


 


Calcium  7.4 L  (8.4-10.2)  mg/dL


 


AST  337 H  (14-36)  U/L


 


ALT  130 H  (9-52)  U/L


 


Alkaline Phosphatase  132 H  ()  U/L


 


Total Protein  4.5 L  (6.3-8.2)  g/dL


 


Albumin  2.2 L  (3.5-5.0)  g/dL








                      Microbiology - Last 24 Hours (Table)











 11/19/19 16:02 Urine Culture - Preliminary





 Urine,Voided 














Assessment and Plan


Assessment: 





Assessment and plan





Sepsis related to UTI


Possible pneumonia left lower lobe


Recent diagnosis of left lower extremity DVT


Chronic conditions: Metastatic melanoma, elevated liver enzymes due to 

metastasis to the liver, protein calorie malnutrition moderate, hypertension, 

dyslipidemia





Patient meets sepsis criteria.  Leukocytosis.  Tachycardia.  Positive source of 

infection.  UA shows moderate leukocyte esterase.  CT abdomen and pelvis shows 

metastatic disease progression along with hyperdense enlargement of the adrenal 

gland, likely metastatic progression, hemorrhage not excluded.  Plans: Follow 

blood culture.  Follow urine culture.  Start Rocephin for treatment a UTI.  

Continue normal saline at 100 mL/h.


As seen on chest x-ray.  Plans: Continue Rocephin and add azithromycin for 

atypical coverage.  Follow sputum culture.  DuoNeb as needed for shortness of 

breath and wheezing.  Follow pulmonology consultation.


Plans: Resume Eliquis.


Consult oncology for continuation of management of metastatic melanoma.





[Patient being treated for sepsis likely related to UTI along with continuation 

of left lower lobe pneumonia.  She is on IV antibiotics.  She is pending 

clinical improvement.  Likely DC in 2-3 days.]

## 2019-11-20 NOTE — XR
EXAMINATION TYPE: XR chest 2V

 

DATE OF EXAM: 11/20/2019

 

COMPARISON: 11/19/2019

 

HISTORY: Pneumonia.

 

TECHNIQUE:  Frontal and lateral views of the chest are obtained.

 

FINDINGS:  Right-sided Mediport terminates in the superior vena cava. Diffuse interstitial prominence
 is slightly more pronounced than on the prior. Bulbous contour of the minor fissure likely relates t
o a trace amount of intrafissural fluid. There is blunting of the costophrenic angle on the left rela
otilia to trace pleural effusion. Slight improved aeration of the retrocardiac airspace. Surgical clips 
are seen within the left axillary soft tissues. Osseous structures are generally demineralized.

 

IMPRESSION:  Improved left basilar opacity. Trace left pleural effusion remains with mild diffuse int
erstitial prominence.

## 2019-11-20 NOTE — CT
EXAMINATION TYPE: CT abdomen pelvis wo con

 

DATE OF EXAM: 11/20/2019

 

HISTORY: Left flank pain rule out kidney Stone. History of metastatic melanoma.

 

CT DLP: 565.2 mGycm.  Automated Exposure Control for Dose Reduction was Utilized.

 

TECHNIQUE:  CT scan of the abdomen and pelvis is performed without oral or IV contrast.

 

COMPARISON: PET/CT August 17, 2019. CTA chest November 4, 2019.

 

FINDINGS:  Within the limitations of a non-contrast study, the following observations are made.

 

LUNG BASES: Correlating with recent CTA chest study there are new bilateral suspicious nodules in the
 lung bases consistent with metastatic disease. There is small left and tiny right pleural effusion. 
There is bibasilar linear scarring and/or atelectasis.

 

LIVER/GB: There are innumerable heterogeneous hypodense lesions throughout the liver consistent with 
multiple metastatic foci. For reference is 2.8 cm lesion on long axis centrally and liver axial image
 21. For reference is a 2.2 cm lesion posterior right hepatic lobe axial image 37. For reference is 3
.4 cm right hepatic lobe lesion inferiorly axial image 58.

 

PANCREAS: No significant abnormality is seen.

 

SPLEEN: Marked splenomegaly occupying majority of the left upper to mid abdomen measuring 19.8 cm cra
niocaudal dimension coronal image 38 with innumerable heterogeneous hypodense metastatic lesions. For
 reference 6.6 cm lesion posterior upper spleen axial image 28. For reference 3.6 cm anterior upper s
plenic lesion axial image 31.

 

ADRENALS: New bilateral adrenal heterogeneous hyperdense masses measuring 3.8 x 3.4 cm size image 43 
and left slightly larger at 4.3 x 3.9 cm same image.

 

KIDNEYS: There is prominent right renal pelvis with mild calyceal dilatation similar to prior PET/CT 
consistent with mild hydronephrosis and extrarenal pelvis. No obstructing mass or calculus seen. No h
ydroureter.

 

Left kidney redemonstrates cortical thinning upper pole left kidney shows heterogeneous hyperdense ar
ea just posterior to the adrenal mass axial image 53 could reflect metastatic lesion or hemorrhage. N
o renal calculi bilaterally. The left-sided hydronephrosis.

 

BOWEL: No suspicious small or large bowel dilatation. Stomach is poorly distended and thus suboptimal
ly evaluated.

 

GENITAL ORGANS: Uterus is surgically absent or markedly atrophic. Small amount of free fluid in the p
nahum axial image 117 extending to right of midline at

 

LYMPH NODES: No greater than 1cm abdominal or pelvic lymph nodes are appreciated.

 

OSSEOUS STRUCTURES: Facet arthropathy lower lumbar levels. Multilevel mild spurring in the thoracolum
bar spine.

 

OTHER: Mild to moderate calcified plaque of the aorta extends into the iliac branch vessels.

 

IMPRESSION:

1. Confirmation of new splenomegaly since August 17 PET/CT with metastatic disease progression in the
 lungs, liver, and spleen. New adrenal hyperdense enlargement or masses likely reflects metastatic pr
ogression. Adrenal hemorrhage is not entirely excluded.

2. No renal calculi bilaterally. No new left-sided hydronephrosis. No hyperdense area superior aspect
 left renal cortex favors metastatic melanoma, hemorrhage at this level cannot be excluded.

 

Correlation with active hemoglobin monitoring advised. If there is concern for active hemorrhage dire
ct catheter angiogram or multi phase CTA study could be performed to further evaluate.

## 2019-11-21 LAB
ALBUMIN SERPL-MCNC: 2.4 G/DL (ref 3.5–5)
ALP SERPL-CCNC: 141 U/L (ref 38–126)
ALT SERPL-CCNC: 139 U/L (ref 9–52)
ANION GAP SERPL CALC-SCNC: 6 MMOL/L
AST SERPL-CCNC: 407 U/L (ref 14–36)
BUN SERPL-SCNC: 27 MG/DL (ref 7–17)
CALCIUM SPEC-MCNC: 7.7 MG/DL (ref 8.4–10.2)
CHLORIDE SERPL-SCNC: 107 MMOL/L (ref 98–107)
CO2 SERPL-SCNC: 23 MMOL/L (ref 22–30)
GLUCOSE SERPL-MCNC: 97 MG/DL (ref 74–99)
POTASSIUM SERPL-SCNC: 4.2 MMOL/L (ref 3.5–5.1)
PROT SERPL-MCNC: 5 G/DL (ref 6.3–8.2)
SODIUM SERPL-SCNC: 136 MMOL/L (ref 137–145)

## 2019-11-21 RX ADMIN — IPRATROPIUM BROMIDE AND ALBUTEROL SULFATE SCH ML: .5; 3 SOLUTION RESPIRATORY (INHALATION) at 09:04

## 2019-11-21 RX ADMIN — IPRATROPIUM BROMIDE AND ALBUTEROL SULFATE SCH ML: .5; 3 SOLUTION RESPIRATORY (INHALATION) at 19:57

## 2019-11-21 RX ADMIN — CEFAZOLIN SCH MLS/HR: 330 INJECTION, POWDER, FOR SOLUTION INTRAMUSCULAR; INTRAVENOUS at 22:32

## 2019-11-21 RX ADMIN — IPRATROPIUM BROMIDE AND ALBUTEROL SULFATE SCH ML: .5; 3 SOLUTION RESPIRATORY (INHALATION) at 12:12

## 2019-11-21 RX ADMIN — SERTRALINE HYDROCHLORIDE SCH MG: 100 TABLET ORAL at 09:31

## 2019-11-21 RX ADMIN — KETOROLAC TROMETHAMINE PRN MG: 30 INJECTION, SOLUTION INTRAMUSCULAR at 05:58

## 2019-11-21 RX ADMIN — KETOROLAC TROMETHAMINE PRN MG: 30 INJECTION, SOLUTION INTRAMUSCULAR at 13:48

## 2019-11-21 RX ADMIN — AZITHROMYCIN SCH MG: 500 TABLET, FILM COATED ORAL at 17:56

## 2019-11-21 RX ADMIN — CEFAZOLIN SCH MLS/HR: 330 INJECTION, POWDER, FOR SOLUTION INTRAMUSCULAR; INTRAVENOUS at 03:30

## 2019-11-21 RX ADMIN — KETOROLAC TROMETHAMINE PRN MG: 30 INJECTION, SOLUTION INTRAMUSCULAR at 21:17

## 2019-11-21 RX ADMIN — SENNOSIDES SCH MG: 8.6 TABLET, FILM COATED ORAL at 09:31

## 2019-11-21 RX ADMIN — APIXABAN SCH MG: 5 TABLET, FILM COATED ORAL at 09:31

## 2019-11-21 RX ADMIN — APIXABAN SCH MG: 5 TABLET, FILM COATED ORAL at 21:18

## 2019-11-21 RX ADMIN — SERTRALINE HYDROCHLORIDE SCH MG: 100 TABLET ORAL at 21:18

## 2019-11-21 RX ADMIN — NORTRIPTYLINE HYDROCHLORIDE SCH MG: 10 CAPSULE ORAL at 21:23

## 2019-11-21 RX ADMIN — PANTOPRAZOLE SODIUM SCH MG: 40 TABLET, DELAYED RELEASE ORAL at 09:31

## 2019-11-21 RX ADMIN — SENNOSIDES SCH MG: 8.6 TABLET, FILM COATED ORAL at 21:18

## 2019-11-21 RX ADMIN — MORPHINE SULFATE PRN MG: 2 INJECTION, SOLUTION INTRAMUSCULAR; INTRAVENOUS at 13:52

## 2019-11-21 RX ADMIN — IPRATROPIUM BROMIDE AND ALBUTEROL SULFATE SCH ML: .5; 3 SOLUTION RESPIRATORY (INHALATION) at 16:36

## 2019-11-21 NOTE — P.PN
Subjective


Progress Note Date: 11/21/19


Principal diagnosis: 





Acute urinary tract infection, sepsis, left lower lobe pneumonia





The patient is seen today 11/21/2019 in follow-up on the regular medical floor. 

She is awake and alert in no acute distress.  She is feeling a bit stronger 

today compared to yesterday.  She is maintaining O2 saturations in the 90s on 2 

L/m per nasal cannula.  She's afebrile.  Hemodynamically stable.  Urine culture 

is positive for gram-negative bacilli.  Blood culture reveals no growth to date.

 Sodium 136.  Potassium 4.2.  Creatinine 0.55.  , , alk phos 141. 

She remains on DuoNeb inhalations, ceftriaxone and azithromycin.





Objective





- Vital Signs


Vital signs: 


                                   Vital Signs











Temp  98.1 F   11/21/19 12:09


 


Pulse  91   11/21/19 12:21


 


Resp  16   11/21/19 12:09


 


BP  120/78   11/21/19 12:09


 


Pulse Ox  93 L  11/21/19 12:09








                                 Intake & Output











 11/20/19 11/21/19 11/21/19





 18:59 06:59 18:59


 


Intake Total 800 740 


 


Balance 800 740 


 


Weight  80.428 kg 


 


Intake:   


 


  Intake, IV Titration 800 150 





  Amount   


 


    Sodium Chloride 0.9% 1, 750 150 





    000 ml @ 50 mls/hr IV .   





    [facility] Rx#:[id number]   


 


    cefTRIAXone 2 gm In 50  





    Sodium Chloride 0.9% 50   





    ml @ 100 mls/hr IVPB ONCE   





    [facility] Rx#:[id number]   


 


  Oral  590 


 


Other:   


 


  Voiding Method Incontinent Incontinent Incontinent


 


  # Voids  2 














- Exam





GENERAL EXAM: Alert, pleasant 74-year-old female patient, on 2 L nasal cannula,,

comfortable in no apparent distress.


HEAD: Normocephalic.


EYES: Normal reaction of pupils, equal size.


NOSE: Clear with pink turbinates.


THROAT: No erythema or exudates.


NECK: No masses, no JVD.


CHEST: No chest wall deformity.


LUNGS: Equal air entry with crackles in the left base.


CVS: S1 and S2 normal with no audible murmur, regular rhythm.


ABDOMEN: No hepatosplenomegaly, normal bowel sounds, no guarding or rigidity.


SPINE: No scoliosis or deformity


SKIN: No rashes


CENTRAL NERVOUS SYSTEM: No focal deficits, tone is normal in all 4 extremities.


EXTREMITIES: There is no peripheral edema.  No clubbing, no cyanosis.  

Peripheral pulses are intact.





- Labs


CBC & Chem 7: 


                                 11/20/19 06:54





                                 11/21/19 07:44


Labs: 


                  Abnormal Lab Results - Last 24 Hours (Table)











  11/21/19 Range/Units





  07:44 


 


Sodium  136 L  (137-145)  mmol/L


 


BUN  27 H  (7-17)  mg/dL


 


Calcium  7.7 L  (8.4-10.2)  mg/dL


 


AST  407 H  (14-36)  U/L


 


ALT  139 H  (9-52)  U/L


 


Alkaline Phosphatase  141 H  ()  U/L


 


Total Protein  5.0 L  (6.3-8.2)  g/dL


 


Albumin  2.4 L  (3.5-5.0)  g/dL








                      Microbiology - Last 24 Hours (Table)











 11/19/19 16:37 Blood Culture - Preliminary





 Blood    No Growth after 24 hours


 


 11/19/19 16:02 Urine Culture - Preliminary





 Urine,Voided    Gram Neg Bacilli














Assessment and Plan


Assessment: 





1 Urinary tract infection secondary to gram-negative bacilli 





2 Left lower lobe pneumonia





3 Metastatic melanoma with lesions in the lung, liver, spleen, kidneys and 

adrenal glands.





4 History of left lower extremity DVT.





5 Hyperlipidemia.





6 Hypertension





7 Previous history of pneumonia





Plan:





The patient was seen and evaluated by Dr. Wang.  She is improved today compared

to yesterday.  We'll continue with the current treatment plan.  Increase her 

activity as tolerated.  We'll continue to follow.





I, the cosigning physician, performed a history & physical examination of the 

patient. Lungs sounds with crackles in the left base.  Maintaining good O2 

saturations in the 90s on 2 L/m per nasal cannula.  I discussed the assessment 

and plan of care with my nurse practitioner, Briana Walsh. I attest to the above 

note as dictated by her.

## 2019-11-21 NOTE — P.PN
Subjective


Progress Note Date: 11/21/19


Principal diagnosis: 





UTI





In follow-up today patient states she does feel much better than she did on 

admit.  She denies fevers, nausea, vomiting, mild to moderate abdominal discom

fort, mostly in the right upper quadrant, no BM, denies tea colored urine





Objective





- Vital Signs


Vital signs: 


                                   Vital Signs











Temp  98.1 F   11/21/19 12:09


 


Pulse  95   11/21/19 16:36


 


Resp  18   11/21/19 16:36


 


BP  120/78   11/21/19 12:09


 


Pulse Ox  96   11/21/19 16:36








                                 Intake & Output











 11/20/19 11/21/19 11/21/19





 18:59 06:59 18:59


 


Intake Total 800 740 400


 


Balance 800 740 400


 


Weight  80.428 kg 


 


Intake:   


 


  Intake, IV Titration 800 150 400





  Amount   


 


    Sodium Chloride 0.9% 1, 750 150 400





    000 ml @ 50 mls/hr IV .   





    [facility] Rx#:[id number]   


 


    cefTRIAXone 2 gm In 50  





    Sodium Chloride 0.9% 50   





    ml @ 100 mls/hr IVPB ONCE   





    [facility] Rx#:[id number]   


 


  Oral  590 


 


Other:   


 


  Voiding Method Incontinent Incontinent Incontinent


 


  # Voids  2 4














- Constitutional


General appearance: Present: average body habitus, cooperative, no acute 

distress





- EENT


Eyes: Present: anicteric sclerae, EOMI


ENT: Present: hearing grossly normal





- Respiratory


Details: 





Respirations even and unlabored





- Cardiovascular


Details: 





skin warm and dry





- Gastrointestinal


General gastrointestinal: Present: normal bowel sounds, soft





- Neurologic


Neurologic: Present: CNII-XII intact





- Musculoskeletal


Musculoskeletal: Present: generalized weakness





- Psychiatric


Psychiatric: Present: A&O x's 3, appropriate affect, intact judgment & insight





- Labs


CBC & Chem 7: 


                                 11/20/19 06:54





                                 11/21/19 07:44


Labs: 


                  Abnormal Lab Results - Last 24 Hours (Table)











  11/21/19 Range/Units





  07:44 


 


Sodium  136 L  (137-145)  mmol/L


 


BUN  27 H  (7-17)  mg/dL


 


Calcium  7.7 L  (8.4-10.2)  mg/dL


 


AST  407 H  (14-36)  U/L


 


ALT  139 H  (9-52)  U/L


 


Alkaline Phosphatase  141 H  ()  U/L


 


Total Protein  5.0 L  (6.3-8.2)  g/dL


 


Albumin  2.4 L  (3.5-5.0)  g/dL








                      Microbiology - Last 24 Hours (Table)











 11/19/19 16:37 Blood Culture - Preliminary





 Blood    No Growth after 24 hours


 


 11/19/19 16:02 Urine Culture - Preliminary





 Urine,Voided    Gram Neg Bacilli














Assessment and Plan


(1) UTI (urinary tract infection)


Narrative/Plan: 


Cultures pending but, >100k colonies on prelim.  Antibiotics are ordered.  Patie

nt does have improved symptoms.


Current Visit: Yes   Status: Acute   Priority: Medium   Code(s): N39.0 - URINARY

TRACT INFECTION, SITE NOT SPECIFIED   SNOMED Code(s): 45406115


   





(2) Acute DVT (deep venous thrombosis)


Narrative/Plan: 


continue anticoagulants as prescribed


Current Visit: No   Status: Acute   Priority: High   Code(s): I82.409 - ACUTE 

EMBOLISM AND THOMBOS UNSP DEEP VN UNSP LOWER EXTREMITY   SNOMED Code(s): 

120876567698792


   





(3) Metastatic melanoma


Narrative/Plan: 


Reviewed with patient the concerning findings on the CT scan.  There is evidence

of progression.  Unfortunately, patient has not even been able to tolerate her 

current regimen for even a week so,  it has not even had a chance to work!  Case

was discussed with Dr. Musa as well as Dr. Arellano.  Discussed options for dose 

reduction (there are actually 2 more dose reductions possible on her current 

regimen) or changing to another treatment regimen that is available.  I spoke 

with patient and she expressed that she would like to try reduced dose of her 

current regimen.  This will be ordered for her.  She will hold treatment until 

new dose received.


Current Visit: No   Status: Chronic   Priority: High   Code(s): C79.9 - 

SECONDARY MALIGNANT NEOPLASM OF UNSPECIFIED SITE   SNOMED Code(s): 610501777


   





(4) Cancer related pain


Narrative/Plan: 


Will work with patient's medications to get her on a long-acting and a short ac

ting for breakthrough pain.





Medications for prevention of narcotic-induced constipation.


Current Visit: Yes   Status: Acute   Priority: High   Code(s): G89.3 - NEOPLASM 

RELATED PAIN (ACUTE) (CHRONIC)   SNOMED Code(s): 28261878904252


   


Plan: 





Eval for O2.  Nebulizer.  Case discussed with

## 2019-11-21 NOTE — P.PN
Subjective


Progress Note Date: 11/21/19


Principal diagnosis: 





Sepsis





Patient was seen and examined.  No acute events overnight.  Patient reports 

shortness of breath especially with exertion.  States that she is able to walk 

to the washroom from her bed but unable to return without assistance.  She 

denies any chest pain or palpitations.  Complains of poor appetite but no nausea

or vomiting.  No fever or chills.  Complains of some dysuria.





Objective





- Vital Signs


Vital signs: 


                                   Vital Signs











Temp  98.1 F   11/21/19 12:09


 


Pulse  91   11/21/19 12:21


 


Resp  16   11/21/19 12:09


 


BP  120/78   11/21/19 12:09


 


Pulse Ox  93 L  11/21/19 12:09








                                 Intake & Output











 11/20/19 11/21/19 11/21/19





 18:59 06:59 18:59


 


Intake Total 800 740 400


 


Balance 800 740 400


 


Weight  80.428 kg 


 


Intake:   


 


  Intake, IV Titration 800 150 400





  Amount   


 


    Sodium Chloride 0.9% 1, 750 150 400





    000 ml @ 50 mls/hr IV .   





    [facility] Rx#:[id number]   


 


    cefTRIAXone 2 gm In 50  





    Sodium Chloride 0.9% 50   





    ml @ 100 mls/hr IVPB ONCE   





    [facility] Rx#:[id number]   


 


  Oral  590 


 


Other:   


 


  Voiding Method Incontinent Incontinent Incontinent


 


  # Voids  2 4














- Exam





General: [non toxic], [no distress], [appears at stated age]


Derm: [warm], [dry]


Head: [atraumatic], [normocephalic], [symmetric]


Eyes: [EOMI], [no lid lag], [anicteric sclera]


Mouth: [no lip lesion], [mucus membranes moist]


Cardiovascular: [S1S2 reg], [no murmur], [positive DP pulse bilateral], 


Lungs: [decreased breath sounds bilateral], [no rhonchi, no rales] , [no 

accessory muscle use]


Abdominal: [soft], [ nontender to palpation], [no guarding], [no appreciable 

organomegaly]


Ext: [no gross muscle atrophy], [no edema], [no contractures]


Neuro:  [no focal neuro deficits]


Psych: [Alert], [oriented], [appropriate affect] 





- Labs


CBC & Chem 7: 


                                 11/20/19 06:54





                                 11/21/19 07:44


Labs: 


                  Abnormal Lab Results - Last 24 Hours (Table)











  11/21/19 Range/Units





  07:44 


 


Sodium  136 L  (137-145)  mmol/L


 


BUN  27 H  (7-17)  mg/dL


 


Calcium  7.7 L  (8.4-10.2)  mg/dL


 


AST  407 H  (14-36)  U/L


 


ALT  139 H  (9-52)  U/L


 


Alkaline Phosphatase  141 H  ()  U/L


 


Total Protein  5.0 L  (6.3-8.2)  g/dL


 


Albumin  2.4 L  (3.5-5.0)  g/dL








                      Microbiology - Last 24 Hours (Table)











 11/19/19 16:37 Blood Culture - Preliminary





 Blood    No Growth after 24 hours


 


 11/19/19 16:02 Urine Culture - Preliminary





 Urine,Voided    Gram Neg Bacilli














Assessment and Plan


Assessment: 





Assessment and plan





Sepsis related to UTI


Possible pneumonia left lower lobe


Recent diagnosis of left lower extremity DVT


Chronic conditions: Metastatic melanoma, elevated liver enzymes due to m

etastasis to the liver, protein calorie malnutrition moderate, hypertension, 

dyslipidemia





Patient meets sepsis criteria.  Leukocytosis.  Tachycardia.  Positive source of 

infection.  UA shows moderate leukocyte esterase.  CT abdomen and pelvis shows 

metastatic disease progression along with hyperdense enlargement of the adrenal 

gland, likely metastatic progression, hemorrhage not excluded.  Blood culture 

negative.  Urine culture positive for gram-negative bacilli.  Plans: Follow 

blood culture.  Follow urine culture.  Start Rocephin for treatment a UTI.  

Continue normal saline at 100 mL/h.


As seen on chest x-ray.  Plans: Continue Rocephin and add azithromycin for 

atypical coverage.  Follow sputum culture.  DuoNeb as needed for shortness of 

breath and wheezing.  Follow pulmonology consultation.  Repeat chest x-ray 

tomorrow.


Plans: Resume Eliquis.


Consult oncology for continuation of management of metastatic melanoma.





[Patient being treated for sepsis likely related to UTI along with continuation 

of left lower lobe pneumonia.  She is on IV antibiotics.  She is pending 

clinical improvement.  Likely DC in 1-2 days.]

## 2019-11-22 VITALS — RESPIRATION RATE: 16 BRPM

## 2019-11-22 LAB
ALBUMIN SERPL-MCNC: 2.3 G/DL (ref 3.5–5)
ALP SERPL-CCNC: 143 U/L (ref 38–126)
ALT SERPL-CCNC: 154 U/L (ref 9–52)
ANION GAP SERPL CALC-SCNC: 6 MMOL/L
AST SERPL-CCNC: 512 U/L (ref 14–36)
BUN SERPL-SCNC: 25 MG/DL (ref 7–17)
CALCIUM SPEC-MCNC: 7.9 MG/DL (ref 8.4–10.2)
CHLORIDE SERPL-SCNC: 107 MMOL/L (ref 98–107)
CO2 SERPL-SCNC: 23 MMOL/L (ref 22–30)
ERYTHROCYTE [DISTWIDTH] IN BLOOD BY AUTOMATED COUNT: 4.19 M/UL (ref 3.8–5.4)
ERYTHROCYTE [DISTWIDTH] IN BLOOD: 15.8 % (ref 11.5–15.5)
GLUCOSE SERPL-MCNC: 130 MG/DL (ref 74–99)
HCT VFR BLD AUTO: 38.6 % (ref 34–46)
HGB BLD-MCNC: 12.2 GM/DL (ref 11.4–16)
MCH RBC QN AUTO: 29.1 PG (ref 25–35)
MCHC RBC AUTO-ENTMCNC: 31.6 G/DL (ref 31–37)
MCV RBC AUTO: 92 FL (ref 80–100)
PLATELET # BLD AUTO: 148 K/UL (ref 150–450)
POTASSIUM SERPL-SCNC: 4.4 MMOL/L (ref 3.5–5.1)
PROT SERPL-MCNC: 4.7 G/DL (ref 6.3–8.2)
SODIUM SERPL-SCNC: 136 MMOL/L (ref 137–145)
WBC # BLD AUTO: 15.5 K/UL (ref 3.8–10.6)

## 2019-11-22 RX ADMIN — IPRATROPIUM BROMIDE AND ALBUTEROL SULFATE SCH ML: .5; 3 SOLUTION RESPIRATORY (INHALATION) at 20:27

## 2019-11-22 RX ADMIN — SENNOSIDES SCH MG: 8.6 TABLET, FILM COATED ORAL at 21:25

## 2019-11-22 RX ADMIN — APIXABAN SCH MG: 5 TABLET, FILM COATED ORAL at 08:21

## 2019-11-22 RX ADMIN — SERTRALINE HYDROCHLORIDE SCH MG: 100 TABLET ORAL at 21:25

## 2019-11-22 RX ADMIN — IPRATROPIUM BROMIDE AND ALBUTEROL SULFATE SCH ML: .5; 3 SOLUTION RESPIRATORY (INHALATION) at 11:45

## 2019-11-22 RX ADMIN — PROCHLORPERAZINE MALEATE PRN MG: 10 TABLET, FILM COATED ORAL at 17:00

## 2019-11-22 RX ADMIN — SENNOSIDES SCH MG: 8.6 TABLET, FILM COATED ORAL at 08:21

## 2019-11-22 RX ADMIN — KETOROLAC TROMETHAMINE PRN MG: 30 INJECTION, SOLUTION INTRAMUSCULAR at 17:34

## 2019-11-22 RX ADMIN — AZITHROMYCIN SCH MG: 500 TABLET, FILM COATED ORAL at 21:25

## 2019-11-22 RX ADMIN — MORPHINE SULFATE PRN MG: 2 INJECTION, SOLUTION INTRAMUSCULAR; INTRAVENOUS at 21:26

## 2019-11-22 RX ADMIN — CEFAZOLIN SCH MLS/HR: 330 INJECTION, POWDER, FOR SOLUTION INTRAMUSCULAR; INTRAVENOUS at 17:32

## 2019-11-22 RX ADMIN — NORTRIPTYLINE HYDROCHLORIDE SCH MG: 10 CAPSULE ORAL at 21:25

## 2019-11-22 RX ADMIN — IPRATROPIUM BROMIDE AND ALBUTEROL SULFATE SCH ML: .5; 3 SOLUTION RESPIRATORY (INHALATION) at 08:41

## 2019-11-22 RX ADMIN — IPRATROPIUM BROMIDE AND ALBUTEROL SULFATE SCH ML: .5; 3 SOLUTION RESPIRATORY (INHALATION) at 16:45

## 2019-11-22 RX ADMIN — APIXABAN SCH MG: 5 TABLET, FILM COATED ORAL at 21:25

## 2019-11-22 RX ADMIN — SERTRALINE HYDROCHLORIDE SCH MG: 100 TABLET ORAL at 08:21

## 2019-11-22 RX ADMIN — PANTOPRAZOLE SODIUM SCH MG: 40 TABLET, DELAYED RELEASE ORAL at 08:21

## 2019-11-22 RX ADMIN — MORPHINE SULFATE PRN MG: 2 INJECTION, SOLUTION INTRAMUSCULAR; INTRAVENOUS at 16:40

## 2019-11-22 RX ADMIN — MORPHINE SULFATE PRN MG: 2 INJECTION, SOLUTION INTRAMUSCULAR; INTRAVENOUS at 02:09

## 2019-11-22 RX ADMIN — MORPHINE SULFATE PRN MG: 2 INJECTION, SOLUTION INTRAMUSCULAR; INTRAVENOUS at 10:03

## 2019-11-22 NOTE — P.PN
Subjective


Progress Note Date: 11/22/19


Principal diagnosis: 





Sepsis





Patient was seen and examined.  No acute events overnight.  Patient reports 

continued shortness of breath especially with exertion.  Failed 6 minute walk 

test this morning.  Patient states that she was unable to ambulate much due to 

the shortness of breath.  She denies any chest pain or palpitations.  She denies

any nausea or vomiting.  No fever or chills.





Objective





- Vital Signs


Vital signs: 


                                   Vital Signs











Temp  98.4 F   11/22/19 11:25


 


Pulse  110 H  11/22/19 11:57


 


Resp  16   11/22/19 11:25


 


BP  117/67   11/22/19 11:25


 


Pulse Ox  95   11/22/19 11:25








                                 Intake & Output











 11/21/19 11/22/19 11/22/19





 18:59 06:59 18:59


 


Intake Total 400 1290 


 


Balance 400 1290 


 


Weight  81.647 kg 


 


Intake:   


 


  Intake, IV Titration 400 500 





  Amount   


 


    Sodium Chloride 0.9% 1, 400 450 





    000 ml @ 50 mls/hr IV .   





    [facility] Rx#:[id number]   


 


    cefTRIAXone 1 gm In  50 





    Sodium Chloride 0.9% 50   





    ml @ 100 mls/hr IVPB HS   





    [facility] Rx#:[id number]   


 


  Oral  790 


 


Other:   


 


  Voiding Method Incontinent Toilet Toilet





  Incontinent Incontinent


 


  # Voids 4 2 














- Exam





General: [non toxic], [no distress], [appears at stated age]


Derm: [warm], [dry]


Head: [atraumatic], [normocephalic], [symmetric]


Eyes: [EOMI], [no lid lag], [anicteric sclera]


Mouth: [no lip lesion], [mucus membranes moist]


Cardiovascular: [S1S2 reg], [no murmur], [positive DP pulse bilateral], 


Lungs: [decreased breath sounds bilateral], [no rhonchi, no rales] , [no 

accessory muscle use]


Abdominal: [soft], [ nontender to palpation], [no guarding], [no appreciable 

organomegaly]


Ext: [no gross muscle atrophy], [no edema], [no contractures]


Neuro:  [no focal neuro deficits]


Psych: [Alert], [oriented], [appropriate affect] 





- Labs


CBC & Chem 7: 


                                 11/22/19 08:27





                                 11/22/19 08:27


Labs: 


                  Abnormal Lab Results - Last 24 Hours (Table)











  11/22/19 11/22/19 Range/Units





  08:27 08:27 


 


WBC   15.5 H  (3.8-10.6)  k/uL


 


RDW   15.8 H  (11.5-15.5)  %


 


Plt Count   148 L  (150-450)  k/uL


 


Sodium  136 L   (137-145)  mmol/L


 


BUN  25 H   (7-17)  mg/dL


 


Glucose  130 H   (74-99)  mg/dL


 


Calcium  7.9 L   (8.4-10.2)  mg/dL


 


AST  512 H   (14-36)  U/L


 


ALT  154 H   (9-52)  U/L


 


Alkaline Phosphatase  143 H   ()  U/L


 


Total Protein  4.7 L   (6.3-8.2)  g/dL


 


Albumin  2.3 L   (3.5-5.0)  g/dL








                      Microbiology - Last 24 Hours (Table)











 11/19/19 16:02 Urine Culture - Final





 Urine,Voided    Escherichia coli


 


 11/19/19 16:37 Blood Culture - Preliminary





 Blood    No Growth after 48 hours














Assessment and Plan


Assessment: 





Assessment and plan





Sepsis related to UTI


Possible pneumonia left lower lobe


Recent diagnosis of left lower extremity DVT


Chronic conditions: Metastatic melanoma, elevated liver enzymes due to 

metastasis to the liver, protein calorie malnutrition moderate, hypertension, 

dyslipidemia





Patient meets sepsis criteria.  Leukocytosis.  Tachycardia.  Positive source of 

infection.  UA shows moderate leukocyte esterase.  CT abdomen and pelvis shows 

metastatic disease progression along with hyperdense enlargement of the adrenal 

gland, likely metastatic progression, hemorrhage not excluded.  Blood culture 

negative.  Urine culture positive for E. coli.  Plans: Follow blood culture.  

Follow urine culture.  Start Rocephin for treatment a UTI.  Decrease IVF from 

100 mL to 50 mL/h.


As seen on chest x-ray.  Plans: Continue Rocephin and add azithromycin for 

atypical coverage.  Follow sputum culture.  DuoNeb as needed for shortness of 

breath and wheezing.  Follow pulmonology consultation.  Repeat chest x-ray 

tomorrow.


Plans: Resume Eliquis.


Consult oncology for continuation of management of metastatic melanoma.





[Patient being treated for sepsis likely related to UTI along with continuation 

of left lower lobe pneumonia.  She is on IV antibiotics.  Her breathing has not 

improved much since admission.  Would benefit from 1 more day of antibiotics and

 breathing treatments around the clock.  Likely DC tomorrow.]

## 2019-11-22 NOTE — P.PN
Subjective


Progress Note Date: 11/22/19


Principal diagnosis: 





UTI





In f/u today pt feels she is doing ok, has not had a BM recently, mild abd 

discomfort, no oral irritation, nausea, chest pain, she has SOB on exertion, BLE

swelling, she is not ambulating too far.  





Objective





- Vital Signs


Vital signs: 


                                   Vital Signs











Temp  98.4 F   11/22/19 11:25


 


Pulse  110 H  11/22/19 11:57


 


Resp  16   11/22/19 11:25


 


BP  117/67   11/22/19 11:25


 


Pulse Ox  95   11/22/19 11:25








                                 Intake & Output











 11/21/19 11/22/19 11/22/19





 18:59 06:59 18:59


 


Intake Total 400 1290 


 


Balance 400 1290 


 


Weight  81.647 kg 


 


Intake:   


 


  Intake, IV Titration 400 500 





  Amount   


 


    Sodium Chloride 0.9% 1, 400 450 





    000 ml @ 50 mls/hr IV .   





    Q20H ROLO Rx#:[id number]   


 


    cefTRIAXone 1 gm In  50 





    Sodium Chloride 0.9% 50   





    ml @ 100 mls/hr IVPB HS   





    ROLO Rx#:[id number]   


 


  Oral  790 


 


Other:   


 


  Voiding Method Incontinent Toilet Toilet





  Incontinent Incontinent


 


  # Voids 4 2 3














- Constitutional


General appearance: Present: cooperative, no acute distress, obese





- EENT


Eyes: Present: anicteric sclerae, EOMI


ENT: Present: hearing grossly normal





- Respiratory


Respiratory: bilateral: diminished





- Cardiovascular


Heart sounds: normal: S1, S2





- Peripheral edema


  ** leg


Peripheral Edema: bilateral: 1+ (non-pitting)





- Gastrointestinal


General gastrointestinal: Present: normal bowel sounds, soft.  Absent: absent 

bowel sounds, decreased bowel sounds, distended, hepatomegaly, hyperactive bowel

sounds, organomegaly, rigid, scaphoid, splenomegaly, tenderness, umbilical 

hernia, ventral hernia





- Neurologic


Neurologic: Present: CNII-XII intact





- Musculoskeletal


Musculoskeletal: Present: generalized weakness





- Psychiatric


Psychiatric: Present: A&O x's 3, appropriate affect, intact judgment & insight





- Labs


CBC & Chem 7: 


                                 11/22/19 08:27





                                 11/22/19 08:27


Labs: 


                  Abnormal Lab Results - Last 24 Hours (Table)











  11/22/19 11/22/19 Range/Units





  08:27 08:27 


 


WBC   15.5 H  (3.8-10.6)  k/uL


 


RDW   15.8 H  (11.5-15.5)  %


 


Plt Count   148 L  (150-450)  k/uL


 


Sodium  136 L   (137-145)  mmol/L


 


BUN  25 H   (7-17)  mg/dL


 


Glucose  130 H   (74-99)  mg/dL


 


Calcium  7.9 L   (8.4-10.2)  mg/dL


 


AST  512 H   (14-36)  U/L


 


ALT  154 H   (9-52)  U/L


 


Alkaline Phosphatase  143 H   ()  U/L


 


Total Protein  4.7 L   (6.3-8.2)  g/dL


 


Albumin  2.3 L   (3.5-5.0)  g/dL








                      Microbiology - Last 24 Hours (Table)











 11/19/19 16:02 Urine Culture - Final





 Urine,Voided    Escherichia coli


 


 11/19/19 16:37 Blood Culture - Preliminary





 Blood    No Growth after 48 hours














Assessment and Plan


(1) UTI (urinary tract infection)


Narrative/Plan: 


E-coli UTI.  On abx.  Patient does have improved symptoms.


Current Visit: Yes   Status: Acute   Priority: Medium   Code(s): N39.0 - URINARY

TRACT INFECTION, SITE NOT SPECIFIED   SNOMED Code(s): 41029015


   





(2) Acute DVT (deep venous thrombosis)


Narrative/Plan: 


continue anticoagulants as prescribed


Current Visit: No   Status: Acute   Priority: High   Code(s): I82.409 - ACUTE 

EMBOLISM AND THOMBOS UNSP DEEP VN UNSP LOWER EXTREMITY   SNOMED Code(s): 

484183346563760


   





(3) Metastatic melanoma


Narrative/Plan: 


Reviewed with patient the concerning findings on the CT scan.  There is evidence

of progression.  Unfortunately, patient has not even been able to tolerate her 

current regimen for even a week so,  it has not even had a chance to work!  Case

was discussed with Dr. Musa as well as Dr. Arellano.  Discussed options for dose 

reduction (there are actually 2 more dose reductions possible on her current 

regimen) or changing to another treatment regimen that is available.  I spoke 

with patient and she expressed that she would like to try reduced dose of her 

current regimen.  This will be ordered for her.  She will hold treatment until 

new dose received.











Current Visit: No   Status: Chronic   Priority: High   Code(s): C79.9 - 

SECONDARY MALIGNANT NEOPLASM OF UNSPECIFIED SITE   SNOMED Code(s): 945250714


   





(4) Cancer related pain


Narrative/Plan: 


Will work with patient's medications to get her on a long-acting and a short 

acting for breakthrough pain.





Medications for prevention of narcotic-induced constipation.


Current Visit: Yes   Status: Acute   Priority: High   Code(s): G89.3 - NEOPLASM 

RELATED PAIN (ACUTE) (CHRONIC)   SNOMED Code(s): 47769320141208


   





(5) LFT elevation


Narrative/Plan: 


I discussed the elevation with pt and that it has been being monitored.  

Initially thought to be drug related effect.  She has been off tafinlar with no 

decrease in LFTs, in fact, they are continuing to elevate a little each day.  

Concern now is for further progression of disease in the liver-progression was 

noted on CT AP but, now liver function studies are being impacted.  She un

derstands the impact this can have on her treatment.  Pt will cont off of 

melanoma meds for now, she has a f/u appt with Dr. Arellano in chart.  


  


Current Visit: Yes   Status: Acute   Priority: High   Code(s): R94.5 - ABNORMAL 

RESULTS OF LIVER FUNCTION STUDIES   SNOMED Code(s): 532142372


   


Plan: 





Eval for O2.  Nebulizer.  Case discussed with 





Ok for DC from Hem/Onc standpoint once cleared by Attending and Consulting 

Physicians

## 2019-11-22 NOTE — P.PN
Subjective


Progress Note Date: 11/22/19


Principal diagnosis: 


 Acute urinary tract infection, sepsis, left lower lobe pneumonia





 On 11/22/2019 patient seen in follow-up on medical surgical floor.  She is 

breathing easier today, she is awake and alert, no acute distress, lung sounds 

are clear, diminished at the bases, she is afebrile, white blood cell count is 

trending down, 15.5 on today's exam, platelet count is 148, electrolytes, 

unremarkable, B1 is 25 creatinine is 0.67.  Patient remains on accommodation 

Zithromax and Rocephin, urine culture showed E. coli with susceptibility to 

Rocephin.








Objective





- Vital Signs


Vital signs: 


                                   Vital Signs











Temp  97.0 F L  11/22/19 07:41


 


Pulse  104 H  11/22/19 11:00


 


Resp  18   11/22/19 07:41


 


BP  138/81   11/22/19 07:41


 


Pulse Ox  91 L  11/22/19 11:00








                                 Intake & Output











 11/21/19 11/22/19 11/22/19





 18:59 06:59 18:59


 


Intake Total 400 1290 


 


Balance 400 1290 


 


Weight  81.647 kg 


 


Intake:   


 


  Intake, IV Titration 400 500 





  Amount   


 


    Sodium Chloride 0.9% 1, 400 450 





    000 ml @ 50 mls/hr IV .   





    Q20H ROLO Rx#:[id number]   


 


    cefTRIAXone 1 gm In  50 





    Sodium Chloride 0.9% 50   





    ml @ 100 mls/hr IVPB HS   





    ROLO Rx#:[id number]   


 


  Oral  790 


 


Other:   


 


  Voiding Method Incontinent Toilet Toilet





  Incontinent Incontinent


 


  # Voids 4 2 














- Exam


 GENERAL EXAM: Alert, pleasant, 74-year-old white female, in 2 L of oxygen and 

the pulse ox 92% comfortable in no apparent distress.


HEAD: Normocephalic/atraumatic.


EYES: Normal reaction of pupils, equal size.  Conjunctiva pink, sclera white.


NOSE: Clear with pink turbinates.


THROAT: No erythema or exudates.


NECK: No masses, no JVD, no thyroid enlargement, no adenopathy.


CHEST: No chest wall deformity.  Symmetrical expansion. 


LUNGS: Equal air entry with no crackles, wheeze, rhonchi or dullness.


CVS: Regular rate and rhythm, normal S1 and S2, no gallops, no murmurs, no rubs


ABDOMEN: Soft, nontender.  No hepatosplenomegaly, normal bowel sounds, no 

guarding or rigidity.


EXTREMITIES: No clubbing, no edema, no cyanosis, 2+ pulses and upper and lower 

extremities.


MUSCULOSKELETAL: Muscle strength and tone normal.


SPINE: No scoliosis or deformity


SKIN: No rashes


CENTRAL NERVOUS SYSTEM: Alert and oriented -3.  No focal deficits, tone is 

normal in all 4 extremities.


PSYCHIATRIC: Alert and oriented -3.  Appropriate affect.  Intact judgment and 

insight.











- Labs


CBC & Chem 7: 


                                 11/22/19 08:27





                                 11/22/19 08:27


Labs: 


                  Abnormal Lab Results - Last 24 Hours (Table)











  11/22/19 11/22/19 Range/Units





  08:27 08:27 


 


WBC   15.5 H  (3.8-10.6)  k/uL


 


RDW   15.8 H  (11.5-15.5)  %


 


Plt Count   148 L  (150-450)  k/uL


 


Sodium  136 L   (137-145)  mmol/L


 


BUN  25 H   (7-17)  mg/dL


 


Glucose  130 H   (74-99)  mg/dL


 


Calcium  7.9 L   (8.4-10.2)  mg/dL


 


AST  512 H   (14-36)  U/L


 


ALT  154 H   (9-52)  U/L


 


Alkaline Phosphatase  143 H   ()  U/L


 


Total Protein  4.7 L   (6.3-8.2)  g/dL


 


Albumin  2.3 L   (3.5-5.0)  g/dL








                      Microbiology - Last 24 Hours (Table)











 11/19/19 16:02 Urine Culture - Final





 Urine,Voided    Escherichia coli


 


 11/19/19 16:37 Blood Culture - Preliminary





 Blood    No Growth after 48 hours














Assessment and Plan


Plan: 


 Assessment:





1 Urinary tract infection secondary to E. coli with susceptibility to Rocephin





2 Left lower lobe pneumonia





3 Metastatic melanoma with lesions in the lung, liver, spleen, kidneys and 

adrenal glands.





4 History of left lower extremity DVT.





5 Hyperlipidemia.





6 Hypertension





7 Previous history of pneumonia





Plan:





Patient is doing well, no acute distress, no fever or chills, no worsening 

dyspnea, lung sounds are clear on today's exam, increase activity as tolerated, 

she is on appropriate antibiotics for pneumonia and her urinary tract infection,

clinically stable.  Could be considered for discharge from pulmonary perspective





I performed a history & physical examination of the patient and discussed their 

management with my nurse practitioner, Anna Diallo.  I reviewed the nurse 

practitioner's note and agree with the documented findings and plan of care.  Matilde

ng sounds are positive for diminished breath sounds.  The findings and the 

impression was discussed with the patient.  I attest to the documentation by the

nurse practitioner. 








Time with Patient: Less than 30

## 2019-11-23 VITALS — DIASTOLIC BLOOD PRESSURE: 71 MMHG | SYSTOLIC BLOOD PRESSURE: 106 MMHG | TEMPERATURE: 97.5 F

## 2019-11-23 VITALS — HEART RATE: 100 BPM

## 2019-11-23 LAB
ALBUMIN SERPL-MCNC: 2.2 G/DL (ref 3.5–5)
ALP SERPL-CCNC: 125 U/L (ref 38–126)
ALT SERPL-CCNC: 148 U/L (ref 9–52)
ANION GAP SERPL CALC-SCNC: 6 MMOL/L
AST SERPL-CCNC: 737 U/L (ref 14–36)
BUN SERPL-SCNC: 25 MG/DL (ref 7–17)
CALCIUM SPEC-MCNC: 7.7 MG/DL (ref 8.4–10.2)
CHLORIDE SERPL-SCNC: 106 MMOL/L (ref 98–107)
CO2 SERPL-SCNC: 22 MMOL/L (ref 22–30)
GLUCOSE SERPL-MCNC: 76 MG/DL (ref 74–99)
POTASSIUM SERPL-SCNC: 4.5 MMOL/L (ref 3.5–5.1)
PROT SERPL-MCNC: 4.6 G/DL (ref 6.3–8.2)
SODIUM SERPL-SCNC: 134 MMOL/L (ref 137–145)

## 2019-11-23 RX ADMIN — SENNOSIDES SCH MG: 8.6 TABLET, FILM COATED ORAL at 09:31

## 2019-11-23 RX ADMIN — SERTRALINE HYDROCHLORIDE SCH MG: 100 TABLET ORAL at 09:31

## 2019-11-23 RX ADMIN — PANTOPRAZOLE SODIUM SCH MG: 40 TABLET, DELAYED RELEASE ORAL at 09:31

## 2019-11-23 RX ADMIN — IPRATROPIUM BROMIDE AND ALBUTEROL SULFATE SCH ML: .5; 3 SOLUTION RESPIRATORY (INHALATION) at 08:47

## 2019-11-23 RX ADMIN — APIXABAN SCH MG: 5 TABLET, FILM COATED ORAL at 09:31

## 2019-11-23 RX ADMIN — IPRATROPIUM BROMIDE AND ALBUTEROL SULFATE SCH: .5; 3 SOLUTION RESPIRATORY (INHALATION) at 11:37

## 2019-11-23 RX ADMIN — IPRATROPIUM BROMIDE AND ALBUTEROL SULFATE SCH ML: .5; 3 SOLUTION RESPIRATORY (INHALATION) at 08:21

## 2019-11-23 RX ADMIN — KETOROLAC TROMETHAMINE PRN MG: 30 INJECTION, SOLUTION INTRAMUSCULAR at 09:32

## 2019-11-23 NOTE — PN
PROGRESS NOTE



PULMONARY/CRITICAL CARE PROGRESS NOTE:



DATE OF SERVICE:

11/23/2019



This is a 74-year-old female who has been seen in consultation for both left 
lower lobe

pneumonia and E coli urinary tract infection.  The patient is doing reasonably 
well.

She has a history of metastatic melanoma.  She has lesions in the liver, lung, 
spleen,

kidneys, and adrenal glands.  She apparently was initially on Opdivo but failed 
that

treatment and is on two new medications currently, although, even at reduce 
doses, she

has had complications and problems.  She also has history of left lower 
extremity DVT,

hyperlipidemia, hypertension, and a prior history of pneumonia.  Currently, she 
is

doing better.  Her chest x-ray today in my opinion is improved.



She still is coughing, producing some phlegm.  No fever or chills.  She is 
minimally

short of breath.



Current vital signs are reviewed, temperature 97.5, heart rate 100, respiratory 
rate

16, blood pressure 106/71 mean 82 and 2 L saturation 95%.  Appears in no acute

distress.

HEENT:  Examination is grossly unremarkable.  Mucous membranes are moist.  No 
oral

lesions.  Nasal O2 noted.

NECK:  Supple.  Full range of motion.  No adenopathy or thyromegaly.  Neck veins
are

flat.

CARDIOVASCULAR:  Examination reveals regular rhythm and rate.  Heart rate about 
82

beats per minute.  S1, S2 normal.  No distinct murmur.

LUNGS:  Reveal a few scattered rhonchi.  Breath sounds are improved.  No wheezes
or

crackles.

ABDOMEN:  Soft.  Bowel sounds are heard.

EXTREMITIES;  Intact.  No cyanosis, clubbing, or edema.

SKIN: Without rash.

NEUROLOGIC: Examination is brief but nonfocal.



LABS:

Reviewed.  Today sodium 134, potassium 4.5, chloride is 106, CO2 is 22, anion 
gap is 6.

BUN and creatinine were 25 and 0.63.  Her AST is 737, , alkaline 
phosphatase

125, albumin 2.2.  Microbiology was only positive in the urine for Escherichia 
coli.



Chest x-ray is reviewed and appears to be improved to me.



Her prior x-ray showed a patchy infiltrate in the left lung.



Current medications are reviewed.



ASSESSMENT:

1. Escherichia coli urinary tract infection, susceptible to Rocephin.

2. Left lower lobe pneumonia, treated with Rocephin and azithromycin.

3. Metastatic melanoma with lesions in the lung, liver, spleen, kidneys and 
adrenal

    glands.

4. History of left lower extremity deep venous thrombosis.

5. Hyperlipidemia.

6. Hypertension.

7. Prior history of pneumonia.





Plan dated 11/23/2019



Currently, the patient is on appropriate antibiotics.  Clinically, she appears 
to be

improved.  Her x-ray also appears to be improved.  She still has some shortness 
of

breath, chest congestion, cough and phlegm production.  Will continue to follow.
 No

additional recommendations are made.  Prognosis is guarded given her widely 
metastatic

melanoma.





MMODL / IJN: [id number] / Job#: [id number]

[email or web address]

## 2019-11-23 NOTE — XR
EXAMINATION TYPE: XR chest 2V

 

DATE OF EXAM: 11/23/2019

 

COMPARISON: Chest x-ray 11/20/2019, CT chest 11/4/2019

 

HISTORY: Pneumonia

 

TECHNIQUE:  Frontal and lateral views of the chest are obtained.

 

FINDINGS:  Right internal jugular Mediport with high position of the tip projecting over the superior
 vena cava. Cardiac silhouette is stable in size. No pulmonary vascular congestion. Nodular opacities
 present throughout both lungs correlating with comparative CT findings. Patchy retrocardiac airspace
 opacities similar to slightly progressed in the interim. Right lung is clear. Trace bilateral pleura
l effusions. Surgical clips project over the left axillary soft tissues.

 

IMPRESSION:  

Stable to slightly worsened retrocardiac opacity. Trace bilateral pleural effusions.

## 2019-11-23 NOTE — P.DS
Providers


Date of admission: 


11/19/19 19:26





Expected date of discharge: 11/23/19


Attending physician: 


Brenda Coleman MD





Consults: 





                                        





11/19/19 19:26


Consult Physician Routine 


   Consulting Provider: Rene Musa


   Consult Reason/Comments: Metastatic melanoma


   Do you want consulting provider notified?: Yes





11/20/19 10:43


Consult Physician Routine 


   Consulting Provider: Dom Tello


   Consult Reason/Comments: HCAP


   Do you want consulting provider notified?: Yes











Primary care physician: 


Brian Simpson





[facility] Course: 





74-year-old female with medical metastatic melanoma, hypertension, recent 

history of DVT in the left lower extremity currently on Eliquis presented to the

ED for 5 day history of urinary incontinence along with back pain.  Patient also

reported productive cough with yellow-green sputum along with pleuritic chest 

pain with strong bouts of coughing.  Patient endorsed a recurrent pneumonias in 

the past.  In the ED, patient was noted to have elevated white count along with 

elevated liver enzymes.  Chest x-ray showed possible small left lower lobe 

infiltrate.  Urinalysis showed small leukocyte esterase.  Patient was admitted 

for sepsis due to recurrent pneumonia and urinary tract infection.





Patient initially met sepsis criteria on admission with an elevated white count,

heart rate greater than 100 and a positive source of infection.  Urinalysis 

showed small leukocyte esterase.  CT of the abdomen and pelvis was done which 

showed metastatic disease progression along with hyperdense enlargement of the 

adrenal gland, likely metastatic progression, hemorrhage cannot be excluded.  

Blood cultures were performed and were negative.  Urine culture was positive for

E. coli.  She was started on Rocephin for treatment UTI and azithromycin was 

added for atypical coverage of pneumonia.  She was initially hydrated with 

normal saline at 100 mL/h which is decreased to 50 at the time of discharge.  

With regard to her pneumonia, she was given DuoNeb treatments scheduled and as 

needed for shortness of breath and wheezing.  Eliquis was resumed for her recent

diagnosis of left lower extremity DVT.  Hematology oncology was consulted for 

continuation of care for her metastatic melanoma and recommended outpatient 

follow-up.  Pulmonology was consulted and followed the patient throughout her 

hospitalization and eventually cleared the patient for discharge.





Patient was seen and examined.  No acute events overnight.  Patient reports 

exertional shortness of breath especially with ambulation.  States that she has 

been coughing up more sputum.  She denies any chest pain or palpitations.  No 

nausea or vomiting.  No fever or chills.  She is looking for to going home.  

States that she has her  and son and I can look after her at home.





General: [non toxic], [no distress], [appears at stated age]


Derm: [warm], [dry]


Head: [atraumatic], [normocephalic], [symmetric]


Eyes: [EOMI], [no lid lag], [anicteric sclera]


Mouth: [no lip lesion], [mucus membranes moist]


Cardiovascular: [S1S2 reg], [no murmur], [positive DP pulse bilateral], 


Lungs: [decreased breath sounds bilateral], [no rhonchi, no rales] , [no 

accessory muscle use]


Abdominal: [soft], [ nontender to palpation], [no guarding], [no appreciable 

organomegaly]


Ext: [no gross muscle atrophy], [no edema], [no contractures]


Neuro:  [no focal neuro deficits]


Psych: [Alert], [oriented], [appropriate affect] 





Assessment and plan





Sepsis related to UTI


Possible pneumonia left lower lobe


Recent diagnosis of left lower extremity DVT


Chronic conditions: Metastatic melanoma, elevated liver enzymes due to 

metastasis to the liver, protein calorie malnutrition moderate, hypertension, 

dyslipidemia





Patient meets sepsis criteria.  Leukocytosis.  Tachycardia.  Positive source of 

infection.  UA shows moderate leukocyte esterase.  CT abdomen and pelvis shows 

metastatic disease progression along with hyperdense enlargement of the adrenal 

gland, likely metastatic progression, hemorrhage not excluded.  Blood culture 

negative.  Urine culture positive for E. coli.  Plans: Follow blood culture.  

Follow urine culture.  Start Rocephin for treatment a UTI.  Decrease IVF from 

100 mL to 50 mL/h.


As seen on chest x-ray.  Plans: Continue Rocephin and add azithromycin for 

atypical coverage.  Follow sputum culture.  DuoNeb as needed for shortness of 

breath and wheezing.  Follow pulmonology consultation.  Repeat chest x-ray 

tomorrow.


Plans: Resume Eliquis.


Consult oncology for continuation of management of metastatic melanoma.





[Patient being treated for sepsis likely related to UTI along with continuation 

of left lower lobe pneumonia.  We will complete 4 more days of Levaquin by 

mouth.  Nebulizer machine has been ordered so patient can receive DuoNeb 

treatment at home, she does have metastatic disease to the lungs.  Home oxygen 

has been ordered because patient has failed 6 minute walk test.  Repeat chest x-

ray in 1 week.  Repeat CBC and CMP in 3 days.  Follow-up with PCP within 3 days.

  Follow up with oncology within 1 week.]





Assessment: 


Chest x-ray, CT abdomen and pelvis


Patient Condition at Discharge: Stable





Plan - Discharge Summary


Discharge Rx Participant: Yes


New Discharge Prescriptions: 


New


   Ipratropium-Albuterol Nebulize [Duoneb 0.5 mg-3 mg/3 ml Soln] 3 ml INHALATION

RT-QID #90 ampul.neb


   Levofloxacin [Levaquin] 750 mg PO DAILY 4 Days #4 tab





Continue


   Prochlorperazine [Compazine] 10 mg PO Q6H PRN


     PRN Reason: Nausea


   Sertraline [Zoloft] 100 mg PO BID


   Omeprazole 20 mg PO DAILY


   Nystatin-Triamcinolone Oint [Mycolog 100,000-0.1 Unit/gm-% Oint] 1 applic 

TOPICAL BID PRN


     PRN Reason: Skin Irritation


   amLODIPine BESYLATE [Norvasc] 2.5 mg PO DAILY


   Nortriptyline [Pamelor] 10 mg PO HS


   Meloxicam [Mobic] 7.5 mg PO BID


   Trametinib Dimethyl Sulfoxide [Mekinist] 2 mg PO DAILY


   Tafinlar 75mg Capsule 150 mg PO Q12H


   Sennosides [Senokot] 8.6 mg PO BID 30 Days #60 tab


   HYDROcodone/APAP 5-325MG [Norco 5-325] 1 tab PO Q4HR PRN


     PRN Reason: Pain


   Apixaban [Eliquis] 5 mg PO BID


Discharge Medication List





Meloxicam [Mobic] 7.5 mg PO BID 11/04/19 [History]


Nortriptyline [Pamelor] 10 mg PO HS 11/04/19 [History]


Nystatin-Triamcinolone Oint [Mycolog 100,000-0.1 Unit/gm-% Oint] 1 applic 

TOPICAL BID PRN 11/04/19 [History]


Omeprazole 20 mg PO DAILY 11/04/19 [History]


Prochlorperazine [Compazine] 10 mg PO Q6H PRN 11/04/19 [History]


Sertraline [Zoloft] 100 mg PO BID 11/04/19 [History]


Tafinlar 75mg Capsule 150 mg PO Q12H 11/04/19 [History]


Trametinib Dimethyl Sulfoxide [Mekinist] 2 mg PO DAILY 11/04/19 [History]


amLODIPine BESYLATE [Norvasc] 2.5 mg PO DAILY 11/04/19 [History]


Sennosides [Senokot] 8.6 mg PO BID 30 Days #60 tab 11/07/19 [Rx]


Apixaban [Eliquis] 5 mg PO BID 11/19/19 [History]


HYDROcodone/APAP 5-325MG [Norco 5-325] 1 tab PO Q4HR PRN 11/19/19 [History]


Ipratropium-Albuterol Nebulize [Duoneb 0.5 mg-3 mg/3 ml Soln] 3 ml INHALATION 

RT-QID #90 ampul.neb 11/23/19 [Rx]


Levofloxacin [Levaquin] 750 mg PO DAILY 4 Days #4 tab 11/23/19 [Rx]








Follow up Appointment(s)/Referral(s): 


[facility] [NON-STAFF] - 1-2 Days (call - [phone] [place] number - 

call if you have questions. )


Gianluca Wang DO [Doctor of Osteopathic Medicine] - 1 Week


[facility] [[facility] -  (This is the contact information for [facility] 

Palliative Care team also. )


Brian Simpson MD [Primary Care Provider] - 1-2 days


Ambulatory/Diagnostic Orders: 


Complete Blood Count w/diff [LAB.AMB] Time Frame: 3 Days, Location: None 

Selected


Comprehensive Metabolic Panel [LAB.AMB] Time Frame: 3 Days, Location: None 

Selected


XR chest 2V [RAD.AMB] Time Frame: 7 Days, Location: None Selected


Activity/Diet/Wound Care/Special Instructions: 


Diet: Heart healthy


Follow-up PCP within 3 days of discharge.  Follow up with pulmonology within 1 

week of discharge.  Follow up with oncology within 1 week of discharge.  Take 

medications as advised.  Repeat CMP and CBC within 3 days, follow up results 

with PCP.  Repeat chest x-ray within 1 week, follow up results with PCP.


Discharge Disposition: HOME SELF-CARE

## 2019-11-27 ENCOUNTER — HOSPITAL ENCOUNTER (INPATIENT)
Dept: HOSPITAL 47 - EC | Age: 74
DRG: 871 | End: 2019-11-27
Attending: HOSPITALIST | Admitting: HOSPITALIST
Payer: MEDICARE

## 2019-11-27 VITALS — DIASTOLIC BLOOD PRESSURE: 34 MMHG | SYSTOLIC BLOOD PRESSURE: 100 MMHG

## 2019-11-27 VITALS — TEMPERATURE: 98.8 F | RESPIRATION RATE: 27 BRPM | HEART RATE: 109 BPM

## 2019-11-27 DIAGNOSIS — R31.9: ICD-10-CM

## 2019-11-27 DIAGNOSIS — R65.21: ICD-10-CM

## 2019-11-27 DIAGNOSIS — Z88.1: ICD-10-CM

## 2019-11-27 DIAGNOSIS — A41.9: Primary | ICD-10-CM

## 2019-11-27 DIAGNOSIS — Z80.3: ICD-10-CM

## 2019-11-27 DIAGNOSIS — Z96.659: ICD-10-CM

## 2019-11-27 DIAGNOSIS — C43.9: ICD-10-CM

## 2019-11-27 DIAGNOSIS — Z79.1: ICD-10-CM

## 2019-11-27 DIAGNOSIS — J18.9: ICD-10-CM

## 2019-11-27 DIAGNOSIS — E43: ICD-10-CM

## 2019-11-27 DIAGNOSIS — I21.A1: ICD-10-CM

## 2019-11-27 DIAGNOSIS — I10: ICD-10-CM

## 2019-11-27 DIAGNOSIS — Z66: ICD-10-CM

## 2019-11-27 DIAGNOSIS — B17.9: ICD-10-CM

## 2019-11-27 DIAGNOSIS — C79.71: ICD-10-CM

## 2019-11-27 DIAGNOSIS — N39.0: ICD-10-CM

## 2019-11-27 DIAGNOSIS — Z79.01: ICD-10-CM

## 2019-11-27 DIAGNOSIS — Z87.01: ICD-10-CM

## 2019-11-27 DIAGNOSIS — E78.5: ICD-10-CM

## 2019-11-27 DIAGNOSIS — K72.00: ICD-10-CM

## 2019-11-27 DIAGNOSIS — R63.0: ICD-10-CM

## 2019-11-27 DIAGNOSIS — D73.89: ICD-10-CM

## 2019-11-27 DIAGNOSIS — J96.01: ICD-10-CM

## 2019-11-27 DIAGNOSIS — Z86.718: ICD-10-CM

## 2019-11-27 DIAGNOSIS — Z79.899: ICD-10-CM

## 2019-11-27 DIAGNOSIS — C79.72: ICD-10-CM

## 2019-11-27 DIAGNOSIS — C78.02: ICD-10-CM

## 2019-11-27 DIAGNOSIS — C78.7: ICD-10-CM

## 2019-11-27 DIAGNOSIS — Z80.0: ICD-10-CM

## 2019-11-27 DIAGNOSIS — C78.01: ICD-10-CM

## 2019-11-27 DIAGNOSIS — Z80.1: ICD-10-CM

## 2019-11-27 DIAGNOSIS — N17.0: ICD-10-CM

## 2019-11-27 DIAGNOSIS — M06.9: ICD-10-CM
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
## 2019-11-27 DIAGNOSIS — Z90.710: ICD-10-CM

## 2019-11-27 DIAGNOSIS — E87.1: ICD-10-CM

## 2019-11-27 DIAGNOSIS — E87.5: ICD-10-CM

## 2019-11-27 DIAGNOSIS — E27.49: ICD-10-CM

## 2019-11-27 DIAGNOSIS — Z88.0: ICD-10-CM

## 2019-11-27 DIAGNOSIS — E87.2: ICD-10-CM

## 2019-11-27 DIAGNOSIS — G92: ICD-10-CM

## 2019-11-27 LAB
ALBUMIN SERPL-MCNC: 1.7 G/DL (ref 3.5–5)
ALBUMIN SERPL-MCNC: 2.3 G/DL (ref 3.5–5)
ALP SERPL-CCNC: 230 U/L (ref 38–126)
ALP SERPL-CCNC: 296 U/L (ref 38–126)
ALT SERPL-CCNC: 154 U/L (ref 9–52)
ALT SERPL-CCNC: 189 U/L (ref 9–52)
ANION GAP SERPL CALC-SCNC: 11 MMOL/L
ANION GAP SERPL CALC-SCNC: 8 MMOL/L
APTT BLD: 23.5 SEC (ref 22–30)
APTT BLD: 25.6 SEC (ref 22–30)
AST SERPL-CCNC: 1491 U/L (ref 14–36)
AST SERPL-CCNC: 1799 U/L (ref 14–36)
BASOPHILS # BLD AUTO: 0 K/UL (ref 0–0.2)
BASOPHILS # BLD AUTO: 0.1 K/UL (ref 0–0.2)
BASOPHILS # BLD AUTO: 0.1 K/UL (ref 0–0.2)
BASOPHILS NFR BLD AUTO: 0 %
BASOPHILS NFR BLD AUTO: 0 %
BASOPHILS NFR BLD AUTO: 1 %
BILIRUB UR QL STRIP.AUTO: (no result)
BUN SERPL-SCNC: 50 MG/DL (ref 7–17)
BUN SERPL-SCNC: 54 MG/DL (ref 7–17)
CALCIUM SPEC-MCNC: 6.5 MG/DL (ref 8.4–10.2)
CALCIUM SPEC-MCNC: 7.5 MG/DL (ref 8.4–10.2)
CHLORIDE SERPL-SCNC: 100 MMOL/L (ref 98–107)
CHLORIDE SERPL-SCNC: 106 MMOL/L (ref 98–107)
CK SERPL-CCNC: 204 U/L (ref 30–135)
CO2 BLDA-SCNC: 10 MMOL/L (ref 19–24)
CO2 BLDA-SCNC: 18 MMOL/L (ref 19–24)
CO2 SERPL-SCNC: 17 MMOL/L (ref 22–30)
CO2 SERPL-SCNC: 19 MMOL/L (ref 22–30)
EOSINOPHIL # BLD AUTO: 0 K/UL (ref 0–0.7)
EOSINOPHIL NFR BLD AUTO: 0 %
ERYTHROCYTE [DISTWIDTH] IN BLOOD BY AUTOMATED COUNT: 3.86 M/UL (ref 3.8–5.4)
ERYTHROCYTE [DISTWIDTH] IN BLOOD BY AUTOMATED COUNT: 3.88 M/UL (ref 3.8–5.4)
ERYTHROCYTE [DISTWIDTH] IN BLOOD BY AUTOMATED COUNT: 4.76 M/UL (ref 3.8–5.4)
ERYTHROCYTE [DISTWIDTH] IN BLOOD: 15.8 % (ref 11.5–15.5)
ERYTHROCYTE [DISTWIDTH] IN BLOOD: 15.9 % (ref 11.5–15.5)
ERYTHROCYTE [DISTWIDTH] IN BLOOD: 16.1 % (ref 11.5–15.5)
GLUCOSE BLD-MCNC: 114 MG/DL (ref 75–99)
GLUCOSE BLD-MCNC: 236 MG/DL (ref 75–99)
GLUCOSE BLD-MCNC: 65 MG/DL (ref 75–99)
GLUCOSE BLD-MCNC: 73 MG/DL (ref 75–99)
GLUCOSE BLD-MCNC: 83 MG/DL (ref 75–99)
GLUCOSE SERPL-MCNC: 79 MG/DL (ref 74–99)
GLUCOSE SERPL-MCNC: 84 MG/DL (ref 74–99)
HCO3 BLDA-SCNC: 17 MMOL/L (ref 21–25)
HCO3 BLDA-SCNC: 9 MMOL/L (ref 21–25)
HCO3 BLDV-SCNC: 17 MMOL/L (ref 24–28)
HCT VFR BLD AUTO: 35.1 % (ref 34–46)
HCT VFR BLD AUTO: 35.7 % (ref 34–46)
HCT VFR BLD AUTO: 42.8 % (ref 34–46)
HGB BLD-MCNC: 11.2 GM/DL (ref 11.4–16)
HGB BLD-MCNC: 11.5 GM/DL (ref 11.4–16)
HGB BLD-MCNC: 13.5 GM/DL (ref 11.4–16)
HYALINE CASTS UR QL AUTO: 101 /LPF (ref 0–2)
INR PPP: 1.2 (ref ?–1.2)
INR PPP: 1.2 (ref ?–1.2)
LYMPHOCYTES # SPEC AUTO: 2.4 K/UL (ref 1–4.8)
LYMPHOCYTES # SPEC AUTO: 2.9 K/UL (ref 1–4.8)
LYMPHOCYTES # SPEC AUTO: 3.1 K/UL (ref 1–4.8)
LYMPHOCYTES NFR SPEC AUTO: 11 %
LYMPHOCYTES NFR SPEC AUTO: 12 %
LYMPHOCYTES NFR SPEC AUTO: 13 %
MCH RBC QN AUTO: 28.4 PG (ref 25–35)
MCH RBC QN AUTO: 29 PG (ref 25–35)
MCH RBC QN AUTO: 29.6 PG (ref 25–35)
MCHC RBC AUTO-ENTMCNC: 31.6 G/DL (ref 31–37)
MCHC RBC AUTO-ENTMCNC: 31.9 G/DL (ref 31–37)
MCHC RBC AUTO-ENTMCNC: 32.1 G/DL (ref 31–37)
MCV RBC AUTO: 90 FL (ref 80–100)
MCV RBC AUTO: 90.9 FL (ref 80–100)
MCV RBC AUTO: 92.2 FL (ref 80–100)
MONOCYTES # BLD AUTO: 1 K/UL (ref 0–1)
MONOCYTES # BLD AUTO: 1.3 K/UL (ref 0–1)
MONOCYTES # BLD AUTO: 1.4 K/UL (ref 0–1)
MONOCYTES NFR BLD AUTO: 4 %
MONOCYTES NFR BLD AUTO: 6 %
MONOCYTES NFR BLD AUTO: 6 %
NEUTROPHILS # BLD AUTO: 18.8 K/UL (ref 1.3–7.7)
NEUTROPHILS # BLD AUTO: 19.8 K/UL (ref 1.3–7.7)
NEUTROPHILS # BLD AUTO: 20 K/UL (ref 1.3–7.7)
NEUTROPHILS NFR BLD AUTO: 81 %
NEUTROPHILS NFR BLD AUTO: 82 %
NEUTROPHILS NFR BLD AUTO: 82 %
PCO2 BLDA: 22 MMHG (ref 35–45)
PCO2 BLDA: 40 MMHG (ref 35–45)
PCO2 BLDV: 34 MMHG (ref 37–51)
PH BLDA: 7.23 [PH] (ref 7.35–7.45)
PH BLDA: 7.24 [PH] (ref 7.35–7.45)
PH BLDV: 7.33 [PH] (ref 7.31–7.41)
PH UR: 5.5 [PH] (ref 5–8)
PLATELET # BLD AUTO: 164 K/UL (ref 150–450)
PLATELET # BLD AUTO: 169 K/UL (ref 150–450)
PLATELET # BLD AUTO: 180 K/UL (ref 150–450)
PO2 BLDA: 102 MMHG (ref 83–108)
PO2 BLDA: 301 MMHG (ref 83–108)
POTASSIUM SERPL-SCNC: 4.9 MMOL/L (ref 3.5–5.1)
POTASSIUM SERPL-SCNC: 5.8 MMOL/L (ref 3.5–5.1)
PROT SERPL-MCNC: 3.6 G/DL (ref 6.3–8.2)
PROT SERPL-MCNC: 4.7 G/DL (ref 6.3–8.2)
PROT UR QL: (no result)
PT BLD: 12.3 SEC (ref 9–12)
PT BLD: 12.8 SEC (ref 9–12)
RBC UR QL: >182 /HPF (ref 0–5)
SODIUM SERPL-SCNC: 130 MMOL/L (ref 137–145)
SODIUM SERPL-SCNC: 131 MMOL/L (ref 137–145)
SP GR UR: 1.02 (ref 1–1.03)
SQUAMOUS UR QL AUTO: 4 /HPF (ref 0–4)
UROBILINOGEN UR QL STRIP: 4 MG/DL (ref ?–2)
WBC # BLD AUTO: 22.8 K/UL (ref 3.8–10.6)
WBC # BLD AUTO: 24.4 K/UL (ref 3.8–10.6)
WBC # BLD AUTO: 24.5 K/UL (ref 3.8–10.6)

## 2019-11-27 PROCEDURE — 81001 URINALYSIS AUTO W/SCOPE: CPT

## 2019-11-27 PROCEDURE — 94002 VENT MGMT INPAT INIT DAY: CPT

## 2019-11-27 PROCEDURE — 02HV33Z INSERTION OF INFUSION DEVICE INTO SUPERIOR VENA CAVA, PERCUTANEOUS APPROACH: ICD-10-PCS

## 2019-11-27 PROCEDURE — 80053 COMPREHEN METABOLIC PANEL: CPT

## 2019-11-27 PROCEDURE — 84484 ASSAY OF TROPONIN QUANT: CPT

## 2019-11-27 PROCEDURE — 36556 INSERT NON-TUNNEL CV CATH: CPT

## 2019-11-27 PROCEDURE — 93005 ELECTROCARDIOGRAM TRACING: CPT

## 2019-11-27 PROCEDURE — 85025 COMPLETE CBC W/AUTO DIFF WBC: CPT

## 2019-11-27 PROCEDURE — 5A1935Z RESPIRATORY VENTILATION, LESS THAN 24 CONSECUTIVE HOURS: ICD-10-PCS

## 2019-11-27 PROCEDURE — 96366 THER/PROPH/DIAG IV INF ADDON: CPT

## 2019-11-27 PROCEDURE — 85730 THROMBOPLASTIN TIME PARTIAL: CPT

## 2019-11-27 PROCEDURE — 83605 ASSAY OF LACTIC ACID: CPT

## 2019-11-27 PROCEDURE — 85610 PROTHROMBIN TIME: CPT

## 2019-11-27 PROCEDURE — 36415 COLL VENOUS BLD VENIPUNCTURE: CPT

## 2019-11-27 PROCEDURE — 87040 BLOOD CULTURE FOR BACTERIA: CPT

## 2019-11-27 PROCEDURE — 96365 THER/PROPH/DIAG IV INF INIT: CPT

## 2019-11-27 PROCEDURE — 96368 THER/DIAG CONCURRENT INF: CPT

## 2019-11-27 PROCEDURE — 82550 ASSAY OF CK (CPK): CPT

## 2019-11-27 PROCEDURE — 82805 BLOOD GASES W/O2 SATURATION: CPT

## 2019-11-27 PROCEDURE — 99291 CRITICAL CARE FIRST HOUR: CPT

## 2019-11-27 PROCEDURE — 31500 INSERT EMERGENCY AIRWAY: CPT

## 2019-11-27 PROCEDURE — 0BH17EZ INSERTION OF ENDOTRACHEAL AIRWAY INTO TRACHEA, VIA NATURAL OR ARTIFICIAL OPENING: ICD-10-PCS

## 2019-11-27 PROCEDURE — 87086 URINE CULTURE/COLONY COUNT: CPT

## 2019-11-27 PROCEDURE — 82533 TOTAL CORTISOL: CPT

## 2019-11-27 PROCEDURE — 71045 X-RAY EXAM CHEST 1 VIEW: CPT

## 2019-11-27 PROCEDURE — 82803 BLOOD GASES ANY COMBINATION: CPT

## 2019-11-27 RX ADMIN — CEFAZOLIN SCH MLS/HR: 330 INJECTION, POWDER, FOR SOLUTION INTRAMUSCULAR; INTRAVENOUS at 18:04

## 2019-11-27 RX ADMIN — PROPOFOL SCH MLS/HR: 10 INJECTION, EMULSION INTRAVENOUS at 03:47

## 2019-11-27 RX ADMIN — CEFAZOLIN SCH MLS/HR: 330 INJECTION, POWDER, FOR SOLUTION INTRAMUSCULAR; INTRAVENOUS at 20:13

## 2019-11-27 RX ADMIN — PROPOFOL SCH MLS/HR: 10 INJECTION, EMULSION INTRAVENOUS at 16:53

## 2019-11-27 RX ADMIN — CHLORHEXIDINE GLUCONATE SCH ML: 1.2 RINSE ORAL at 08:01

## 2019-11-27 RX ADMIN — CEFAZOLIN SCH MLS/HR: 330 INJECTION, POWDER, FOR SOLUTION INTRAMUSCULAR; INTRAVENOUS at 16:32

## 2019-11-27 RX ADMIN — NOREPINEPHRINE BITARTRATE SCH MLS/HR: 1 INJECTION, SOLUTION, CONCENTRATE INTRAVENOUS at 00:58

## 2019-11-27 RX ADMIN — HYDROCORTISONE SODIUM SUCCINATE SCH MG: 100 INJECTION, POWDER, FOR SOLUTION INTRAMUSCULAR; INTRAVENOUS at 15:10

## 2019-11-27 RX ADMIN — NOREPINEPHRINE BITARTRATE SCH MLS/HR: 1 INJECTION, SOLUTION, CONCENTRATE INTRAVENOUS at 18:28

## 2019-11-27 RX ADMIN — CEFAZOLIN SCH MLS/HR: 330 INJECTION, POWDER, FOR SOLUTION INTRAMUSCULAR; INTRAVENOUS at 03:46

## 2019-11-27 RX ADMIN — CEFEPIME HYDROCHLORIDE SCH MLS/HR: 2 INJECTION, POWDER, FOR SOLUTION INTRAVENOUS at 20:29

## 2019-11-27 RX ADMIN — CEFEPIME HYDROCHLORIDE SCH MLS/HR: 2 INJECTION, POWDER, FOR SOLUTION INTRAVENOUS at 08:03

## 2019-11-27 RX ADMIN — CHLORHEXIDINE GLUCONATE SCH ML: 1.2 RINSE ORAL at 20:13

## 2019-11-27 RX ADMIN — DEXTROSE MONOHYDRATE ONE MLS/HR: 100 INJECTION, SOLUTION INTRAVENOUS at 18:21

## 2019-11-27 RX ADMIN — PROPOFOL SCH MLS/HR: 10 INJECTION, EMULSION INTRAVENOUS at 18:57

## 2019-11-27 RX ADMIN — DEXTROSE MONOHYDRATE ONE MLS/HR: 100 INJECTION, SOLUTION INTRAVENOUS at 02:19

## 2019-11-27 RX ADMIN — CEFAZOLIN SCH MLS/HR: 330 INJECTION, POWDER, FOR SOLUTION INTRAMUSCULAR; INTRAVENOUS at 08:02

## 2019-11-27 RX ADMIN — PROPOFOL SCH MLS/HR: 10 INJECTION, EMULSION INTRAVENOUS at 07:16

## 2019-11-27 RX ADMIN — HYDROCORTISONE SODIUM SUCCINATE SCH MG: 100 INJECTION, POWDER, FOR SOLUTION INTRAMUSCULAR; INTRAVENOUS at 09:28

## 2019-11-27 NOTE — P.CNPUL
History of Present Illness


Consult date: 11/27/19


Requesting physician: Eneida Mac


Reason for consult: hypoxemia, abnormal CXR/CT


Chief complaint: Confusion, hypotension, septic shock


History of present illness: 


 This is a 74-year-old female patient of Dr. Simpson, with past medical history 

of metastatic melanoma with metastasis to the spleen, bilateral adrenal glands, 

and multiple lung nodules.  Patient was diagnosed in October 2018, she underwent

wide right upper back lesion excision with sentinel node biopsy on 12/06/2018 

showed malignant melanoma, she was seen by oncology at the [facility] was recommended adjuvant therapy with Nivolumab, versus 

Dabrafenib+Trametinib.  She didn't want to receive treatment closer to home and 

she was seen by Dr. Arellano.  Opdivo was started and patient received 7 cycles.  

However patient noted increase in size of the right scapular mass, and patient 

also felt a new nodule in the right upper extremity.  Most recent PET scan on 

08/17/2019 revealed increase in size of the right upper back mass with elevated 

SUV, increased right mid back lesion with increased uptake, bilateral lung 

nodules with some new lung nodulles, as well as a spleen lesion and a 

questionable focus uptake in the central portion of the liver.  Patient was 

started on Tafinlar-Mekinist on 11/04/2019.  Patient started having multiple 

complaints including fevers, increased shortness of breath, generalized 

weakness, nausea and vomiting.  She also noticed extremity swelling, and was 

found to have left lower extremity DVT extending from the popliteal into the 

calf veins, CT angios chest was negative for PE, but did show pneumonitis in 

addition to the metastatic lung nodules.  Liver enzymes also increased since 

start of her treatment.  We saw the patient in consultation during her previous 

hospitalization on 11/20/2019 for possibility of left lower lobe pneumonia, and 

urinary tract infection.  Patient was treated with antibiotics, final urine 

culture showed E. coli which was resistant to Levaquin, ciprofloxacin, 

Augmentin, cefoxitin and cefazolin.  Sputum culture and blood cultures were 

negative.  Patient was sent home on Levaquin on 11/23/2019. 





On 11/27/2019 patient was brought into the emergency department per EMS for 

evaluation of increased weakness, not feeling well, altered mentation.  She has 

had poor oral intake, has been very confused.  Septic workup was initiated at an

outside facility, she was profoundly hypotensive, was given IV hydration, and 

Lasix for treatment of pulmonary edema.  When she arrived at [facility] she was

quite lethargic, hypotensive, tachycardic, and decision was made to intubate the

patient for airway protection.  Her labs resulted with multiple significant 

abnormalities, leukocytosis, hyponatremia hyperkalemia acute kidney injury with 

prerenal azotemia.  Chest x-ray showed mild pulmonary interstitial edema, and 

mild left perihilar infiltrate.  ABGs showed pO2 of 301, pCO2 40, pH is 7.24, 

this was done on FiO2 of 100%.  Plasma lactic acid 2.8, urinalysis shows large 

amount of blood, small amount of lupus, white blood cells, bacteria and budding 

yeast.  Broad-spectrum antibiotics have been started in the form of cefepime and

vancomycin.  And has received a total of 4 L of fluids, currently IV fluids 

infusing at a rate of 150 ML per hour with 0.9 normal saline.  Norepinephrine is

at 0.25 mics per kilo per minute, and diprivan is at 35 mics per kilo per m

inute. 





Review of Systems


All systems: negative


Constitutional: Reports anorexia, Reports poor appetite, Reports weakness, 

Denies chills, Denies fever


Eyes: denies blurred vision, denies pain


Ears, nose, mouth and throat: Denies headache, Denies sore throat


Cardiovascular: Denies chest pain, Denies shortness of breath


Respiratory: Denies cough


Gastrointestinal: Denies abdominal pain, Denies diarrhea, Denies nausea, Denies 

vomiting


Genitourinary: Denies dysuria, Denies hematuria


Musculoskeletal: Denies myalgias


Integumentary: Denies pruritus, Denies rash


Neurological: Reports change in mentation, Reports weakness, Denies numbness


Psychiatric: Denies anxiety, Denies depression


Endocrine: Denies fatigue, Denies weight change





Past Medical History


Past Medical History: Cancer, Deep Vein Thrombosis (DVT), Hyperlipidemia, 

Hypertension, Pneumonia, Rheumatoid Arthritis (RA)


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 

spleen, kidney, back, lung , LLE DVT


History of Any Multi-Drug Resistant Organisms: None Reported


Past Surgical History: Bladder Surgery, Hernia Repair, Hysterectomy, Orthopedic 

Surgery


Additional Past Surgical History / Comment(s): tumor removal right breast, 3 

knee replacements, T& A, tropean eye surgery


Past Anesthesia/Blood Transfusion Reactions: No Reported Reaction


Past Psychological History: No Psychological Hx Reported


Smoking Status: Never smoker


Past Alcohol Use History: None Reported


Past Drug Use History: None Reported





- Past Family History


  ** Father


Family Medical History: Cancer


Additional Family Medical History / Comment(s): lung cancer





  ** Mother


Family Medical History: Cancer


Additional Family Medical History / Comment(s): intestinal cancer





  ** Sister(s)


Family Medical History: Cancer


Additional Family Medical History / Comment(s): breast cancer





  ** Family


Additional Family Medical History / Comment(s): Denies history of cancer





Medications and Allergies


                                Home Medications











 Medication  Instructions  Recorded  Confirmed  Type


 


Meloxicam [Mobic] 7.5 mg PO BID 11/04/19 11/27/19 History


 


Nortriptyline [Pamelor] 10 mg PO HS 11/04/19 11/27/19 History


 


Nystatin-Triamcinolone Oint 1 applic TOPICAL BID PRN 11/04/19 11/27/19 History





[Mycolog 100,000-0.1 Unit/gm-%    





Oint]    


 


Omeprazole 20 mg PO DAILY 11/04/19 11/27/19 History


 


Prochlorperazine [Compazine] 10 mg PO Q6H PRN 11/04/19 11/27/19 History


 


Sertraline [Zoloft] 100 mg PO BID 11/04/19 11/27/19 History


 


Tafinlar 75mg Capsule 150 mg PO Q12H 11/04/19 11/27/19 History


 


Trametinib Dimethyl Sulfoxide 2 mg PO DAILY 11/04/19 11/27/19 History





[Mekinist]    


 


amLODIPine BESYLATE [Norvasc] 2.5 mg PO DAILY 11/04/19 11/27/19 History


 


Sennosides [Senokot] 8.6 mg PO BID 30 Days #60 tab 11/07/19 11/27/19 Rx


 


Apixaban [Eliquis] 5 mg PO BID 11/19/19 11/27/19 History


 


HYDROcodone/APAP 5-325MG [Norco 1 tab PO Q4HR PRN 11/19/19 11/27/19 History





5-325]    


 


Ipratropium-Albuterol Nebulize 3 ml INHALATION RT-QID #90 11/23/19 11/27/19 Rx





[Duoneb 0.5 mg-3 mg/3 ml Soln] ampul.neb   


 


Levofloxacin [Levaquin] 750 mg PO DAILY 4 Days #4 tab 11/23/19 11/27/19 Rx








                                    Allergies











Allergy/AdvReac Type Severity Reaction Status Date / Time


 


ciprofloxacin [From Cipro] Allergy  Hallucinati Verified 11/27/19 07:44





   ons  


 


Penicillins Allergy  Unknown Verified 11/27/19 07:44





   Childhood  














Physical Exam


Vitals: 


                                   Vital Signs











  Temp Pulse Resp BP Pulse Ox


 


 11/27/19 08:30   99  26 H  


 


 11/27/19 08:00  98.8 F  100  26 H  110/28  97


 


 11/27/19 07:30   97  26 H  110/28  97


 


 11/27/19 07:00   96  26 H   96


 


 11/27/19 06:30   96  26 H   92 L


 


 11/27/19 06:00   100  22   94 L


 


 11/27/19 05:30   101 H  22   95


 


 11/27/19 05:00   101 H  21   96


 


 11/27/19 04:30  98.6 F  107 H  16   98


 


 11/27/19 04:00   113 H  20  92/63  100


 


 11/27/19 03:30   113 H  22  85/56  94 L


 


 11/27/19 03:00   120 H  21  99/58  98


 


 11/27/19 02:30   112 H  16  102/56  97


 


 11/27/19 02:00   113 H  16  98/60  98


 


 11/27/19 01:50   114 H  16  103/26  99


 


 11/27/19 01:40   115 H  16  115/50  99


 


 11/27/19 01:30   112 H  16  101/38  99


 


 11/27/19 01:20   111 H  16  88/45  97


 


 11/27/19 01:10   112 H  14  103/52  97


 


 11/27/19 01:00   126 H  14   94 L


 


 11/27/19 00:53   106 H  18   95


 


 11/27/19 00:42  98.2 F  112 H  28 H  82/69  96








                                Intake and Output











 11/26/19 11/27/19 11/27/19





 22:59 06:59 14:59


 


Intake Total  0524.973 3565.692


 


Output Total  140 7


 


Balance  9316.615 9857.692


 


Intake:   


 


  IV  1550 1400


 


    Aztreonam 2 gm In Sodium  100 





    Chloride 0.9% 100 ml @   





    100 mls/hr IVPB Q8H [facility]   





    Rx#:[id number]   


 


    Sodium Chloride 0.9% 1,  450 150





    000 ml @ 150 mls/hr IV .   





    Q6H40M [facility] Rx#:[id number]   


 


    Sodium Chloride 0.9% 1,  1000 1000





    000 ml @ 999 mls/hr IV .   





    Q1H1M ONE Rx#:[id number]   


 


    Vancomycin 1,500 mg In   250





    Sodium Chloride 0.9% 250   





    ml @ 125 mls/hr IVPB Q24H   





    [facility] Rx#:[id number]   


 


  Intake, IV Titration  86.467 58.692





  Amount   


 


    Norepinephrine 32 mg In  14.383 27.671





    Sodium Chloride 0.9% 218   





    ml @ 0.05 MCG/KG/MIN 1.   





    765 mls/hr IV .Q24H [facility]   





    Rx#:[id number]   


 


    Propofol 1,000 mg In  53.385 31.021





    Empty Bag 1 bag @ Titrate   





    IV .Q0M [facility] Rx#:   





    [id number]   


 


    fentaNYL (PF) 1,000 mcg  18.699 





    In Sodium Chloride 0.9%   





    80 ml @ 1 MCG/KG/HR 7.53   





    mls/hr IV .[id number] [facility] Rx   





    #:[id number]   


 


Output:   


 


  Urine  140 7


 


Other:   


 


  Voiding Method  Indwelling Catheter 


 


  Weight  75.296 kg 








                         ABP, PAP, CO, CI - Last 8 Hours











Arterial Blood Pressure        91/52


 


Arterial Blood Pressure        97/54


 


Arterial Blood Pressure        99/55


 


Arterial Blood Pressure        95/56


 


Arterial Blood Pressure        92/55


 


Arterial Blood Pressure        94/53


 


Arterial Blood Pressure        97/53


 


Arterial Blood Pressure        93/52


 


Arterial Blood Pressure        119/58














 GENERAL EXAM: Sedated, intubated, 74-year-old frail looking female comfortable 

in no apparent distress.


HEAD: Normocephalic/atraumatic.


EYES: Normal reaction of pupils, equal size.  Conjunctiva pink, sclera white.


NOSE: Clear with pink turbinates.


THROAT: No erythema or exudates.


NECK: No masses, no JVD, no thyroid enlargement, no adenopathy.


CHEST: No chest wall deformity.  Symmetrical expansion. 


LUNGS: Equal air entry with no crackles, wheeze, rhonchi or dullness.


CVS: Regular rate and rhythm, normal S1 and S2, no gallops, no murmurs, no rubs


ABDOMEN: Soft, nontender.  No hepatosplenomegaly, normal bowel sounds, no 

guarding or rigidity.


EXTREMITIES: No clubbing, 1+ lower extremity edema, no cyanosis, 2+ pulses and 

upper and lower extremities.


MUSCULOSKELETAL: Muscle strength and tone normal.


SPINE: No scoliosis or deformity


SKIN: No rashes


CENTRAL NERVOUS SYSTEM: Sedated and intubated. No focal deficits, tone is normal

in all 4 extremities.














Results





- Laboratory Findings


CBC and BMP: 


                                 11/27/19 04:49





                                 11/27/19 04:49


ABG











ABG pH  7.24  (7.35-7.45)  L  11/27/19  04:39    


 


ABG pCO2  40 mmHg (35-45)   11/27/19  04:39    


 


ABG pO2  301 mmHg ()  H  11/27/19  04:39    


 


ABG O2 Saturation  99.1 % (94-97)  H  11/27/19  04:39    





PT/INR, D-dimer











PT  12.3 sec (9.0-12.0)  H  11/27/19  00:50    


 


INR  1.2  (<1.2)  H  11/27/19  00:50    








Abnormal lab findings: 


                                  Abnormal Labs











  11/27/19 11/27/19 11/27/19





  00:50 00:50 00:50


 


WBC   22.8 H 


 


Hgb   


 


RDW   16.1 H 


 


Neutrophils #   18.8 H 


 


Monocytes #   1.3 H 


 


PT   


 


INR   


 


ABG pH   


 


ABG pO2   


 


ABG HCO3   


 


ABG Total CO2   


 


ABG O2 Saturation   


 


VBG pCO2  34 L  


 


VBG HCO3  17 L  


 


Sodium    130 L


 


Potassium    5.8 H


 


Carbon Dioxide    19 L


 


BUN    54 H


 


Creatinine    1.44 H


 


POC Glucose (mg/dL)   


 


Plasma Lactic Acid Yomi   


 


Calcium    7.5 L


 


Total Bilirubin    2.9 H


 


AST    1799 H


 


ALT    189 H


 


Alkaline Phosphatase    296 H


 


Creatine Kinase    204 H


 


Troponin I   


 


Total Protein    4.7 L


 


Albumin    2.3 L


 


Urine Appearance   


 


Urine Protein   


 


Urine Blood   


 


Urine Bilirubin   


 


Ur Leukocyte Esterase   


 


Urine RBC   


 


Urine WBC   


 


Urine WBC Clumps   


 


Amorphous Sediment   


 


Urine Bacteria   


 


Hyaline Casts   


 


Urine Mucus   


 


Urine Yeast (Budding)   














  11/27/19 11/27/19 11/27/19





  00:50 00:50 00:50


 


WBC   


 


Hgb   


 


RDW   


 


Neutrophils #   


 


Monocytes #   


 


PT   12.3 H 


 


INR   1.2 H 


 


ABG pH   


 


ABG pO2   


 


ABG HCO3   


 


ABG Total CO2   


 


ABG O2 Saturation   


 


VBG pCO2   


 


VBG HCO3   


 


Sodium   


 


Potassium   


 


Carbon Dioxide   


 


BUN   


 


Creatinine   


 


POC Glucose (mg/dL)   


 


Plasma Lactic Acid Yomi  2.8 H*  


 


Calcium   


 


Total Bilirubin   


 


AST   


 


ALT   


 


Alkaline Phosphatase   


 


Creatine Kinase   


 


Troponin I    0.048 H*


 


Total Protein   


 


Albumin   


 


Urine Appearance   


 


Urine Protein   


 


Urine Blood   


 


Urine Bilirubin   


 


Ur Leukocyte Esterase   


 


Urine RBC   


 


Urine WBC   


 


Urine WBC Clumps   


 


Amorphous Sediment   


 


Urine Bacteria   


 


Hyaline Casts   


 


Urine Mucus   


 


Urine Yeast (Budding)   














  11/27/19 11/27/19 11/27/19





  01:21 03:22 04:39


 


WBC   


 


Hgb   


 


RDW   


 


Neutrophils #   


 


Monocytes #   


 


PT   


 


INR   


 


ABG pH    7.24 L


 


ABG pO2    301 H


 


ABG HCO3    17 L


 


ABG Total CO2    18 L


 


ABG O2 Saturation    99.1 H


 


VBG pCO2   


 


VBG HCO3   


 


Sodium   


 


Potassium   


 


Carbon Dioxide   


 


BUN   


 


Creatinine   


 


POC Glucose (mg/dL)   114 H 


 


Plasma Lactic Acid Yomi   


 


Calcium   


 


Total Bilirubin   


 


AST   


 


ALT   


 


Alkaline Phosphatase   


 


Creatine Kinase   


 


Troponin I   


 


Total Protein   


 


Albumin   


 


Urine Appearance  Cloudy H  


 


Urine Protein  2+ H  


 


Urine Blood  Large H  


 


Urine Bilirubin  1+ H  


 


Ur Leukocyte Esterase  Small H  


 


Urine RBC  >182 H  


 


Urine WBC  33 H  


 


Urine WBC Clumps  Few H  


 


Amorphous Sediment  Rare H  


 


Urine Bacteria  Occasional H  


 


Hyaline Casts  101 H  


 


Urine Mucus  Many H  


 


Urine Yeast (Budding)  Occasional H  














  11/27/19 11/27/19 11/27/19





  04:49 04:49 05:30


 


WBC  24.5 H  


 


Hgb  11.2 L  


 


RDW  15.8 H  


 


Neutrophils #  19.8 H  


 


Monocytes #  1.4 H  


 


PT   


 


INR   


 


ABG pH   


 


ABG pO2   


 


ABG HCO3   


 


ABG Total CO2   


 


ABG O2 Saturation   


 


VBG pCO2   


 


VBG HCO3   


 


Sodium   131 L 


 


Potassium   


 


Carbon Dioxide   17 L 


 


BUN   50 H 


 


Creatinine   1.25 H 


 


POC Glucose (mg/dL)   


 


Plasma Lactic Acid Yomi    2.3 H*


 


Calcium   6.5 L 


 


Total Bilirubin   2.8 H 


 


AST   1491 H 


 


ALT   154 H 


 


Alkaline Phosphatase   230 H 


 


Creatine Kinase   


 


Troponin I   


 


Total Protein   3.6 L 


 


Albumin   1.7 L 


 


Urine Appearance   


 


Urine Protein   


 


Urine Blood   


 


Urine Bilirubin   


 


Ur Leukocyte Esterase   


 


Urine RBC   


 


Urine WBC   


 


Urine WBC Clumps   


 


Amorphous Sediment   


 


Urine Bacteria   


 


Hyaline Casts   


 


Urine Mucus   


 


Urine Yeast (Budding)   














  11/27/19





  05:30


 


WBC 


 


Hgb 


 


RDW 


 


Neutrophils # 


 


Monocytes # 


 


PT 


 


INR 


 


ABG pH 


 


ABG pO2 


 


ABG HCO3 


 


ABG Total CO2 


 


ABG O2 Saturation 


 


VBG pCO2 


 


VBG HCO3 


 


Sodium 


 


Potassium 


 


Carbon Dioxide 


 


BUN 


 


Creatinine 


 


POC Glucose (mg/dL) 


 


Plasma Lactic Acid Yomi 


 


Calcium 


 


Total Bilirubin 


 


AST 


 


ALT 


 


Alkaline Phosphatase 


 


Creatine Kinase 


 


Troponin I  0.046 H*


 


Total Protein 


 


Albumin 


 


Urine Appearance 


 


Urine Protein 


 


Urine Blood 


 


Urine Bilirubin 


 


Ur Leukocyte Esterase 


 


Urine RBC 


 


Urine WBC 


 


Urine WBC Clumps 


 


Amorphous Sediment 


 


Urine Bacteria 


 


Hyaline Casts 


 


Urine Mucus 


 


Urine Yeast (Budding) 














- Diagnostic Findings


Chest x-ray: report reviewed, image reviewed





Assessment and Plan


Plan: 


 Assessment:





#1.  Septic shock likely related to urinary tract infection, the possibility of 

pneumonia is not completely excluded





#2.  Secondary adrenal insufficiency due to prolonged illness and sepsis





#3.  Acute respiratory failure related to septic shock, and altered mental stat

us, acute hypoxemic respiratory failure related to hypoperfusion





#4.  Recent hospitalization for left lower lobe pneumonia and urinary tract 

infection, urine culture was positive for E. coli is initially treated with 

Zithromax and Rocephin, but discharged home on Levaquin although E. coli was 

shown to be resistant to Levaquin





#5.  Metastatic melanoma diagnosed in October of 2018, status post resection and

sentinel biopsy showing malignant melanoma.  Patient was treated with Opdivo 

however the disease continued to progress, and most recently on 10/04/2019 

patient received her first treatment of Tafinlar-Mekinist, after which she 

developed shortness of breath, weakness, and left lower extremity swelling, and 

was diagnosed with UTI and sepsis left lower lobe pneumonia and left lower 

extremity DVT.  Most recent PET scan showed metastatic lesions in the lungs, 

liver, spleen, and lateral adrenal glands





#6.  Hypertension





#7.  Hyperlipidemia





#8.  History of left lower extremity DVT on Eliquis





#9.  Rheumatoid arthritis





#10.  Never smoker





#11.  Previous history of pneumonia





Plan:





We will continue current antibiotics with cefepime and vancomycin, blood, urine 

and sputum cultures have been sent and are pending at this time, patient remains

hypotensive, remains on vasopressor support, we will give additional liter of 

0.9 normal saline bolus, for a total of 5 L of IV fluids, continue maintenance 

IV fluids at 150 ML per hour, obtain serum cortisol.  Start stress doses of 

hydrocortisone 100 mg every 8 hours, will start heparin infusion for 

anticoagulation in view of recent history of lower extremity DVT, case patient 

needs any invasive procedures.  We'll nebulized bronchodilators, chest x-ray has

been reviewed showing interstitial edema, left lower pneumonia is not excluded. 

Overall prognosis extremely guarded, patient did not tolerate initiation of 

treatment with Tafinlar-Mekinist,  and did not respond favorably to Opdivo.  

We'll continue supportive treatment, continue ventilator support, continue 

hemodynamic support.





I performed a history & physical examination of the patient and discussed their 

management with my nurse practitioner, Anna Diallo.  I reviewed the nurse 

practitioner's note and agree with the documented findings and plan of care.  

Lung sounds are positive for diminished breath sounds.  The findings and the 

impression was discussed with the patient.  I attest to the documentation by the

nurse practitioner. 





Time with Patient: Greater than 30

## 2019-11-27 NOTE — P.PN
Progress Note - Text


Progress Note Date: 11/27/19


Advanced Care Planning:


Diagnoses: 


Shock, Stage IV Melanoma 








Discussion:


Person(s) present and participating in discussion: Daughter





Summary: Patient admitted with shock with possible PNA,UTI but no definitive 

evidence on CXR or UA. Has known Melanoma, has been unable to tolerate treatment

and has been declining quickly over the last month. Daughter states that Marizol 

would not want to be kept alive if there was no hope of recovery. We decided 

that continuing with vasopressors is appropriate to give her a chance to 

improve. However if her heart were to stop we would no preform CPR. This seems 

to appropriate reflect the patients wishes. I offered to call the patient's son.

However, per the daughter this is not necessary and the family will all be on 

the same page. 








A total of 17 minutes of face to face time was spent discussing advanced care 

planning.

## 2019-11-27 NOTE — XR
EXAMINATION TYPE: XR chest 1V portable

 

DATE OF EXAM: 11/27/2019

 

COMPARISON: 11/23/2019

 

HISTORY: Fever

 

TECHNIQUE: Single frontal view of the chest is obtained.

 

FINDINGS:  Supine view shows the endotracheal tube is 2 cm from the saw. There is mild pulmonary i
nterstitial edema. There is right central venous catheter with tip in the superior vena cava. There a
re chest leads. There is a mild left perihilar infiltrate.

 

IMPRESSION:  Endotracheal tube is low and could be pulled back 1 to 2 cm. There is a new mild left pe
rihilar pulmonary infiltrate compared to last exam. Mild pulmonary interstitial edema.

## 2019-11-27 NOTE — XR
EXAMINATION TYPE: XR chest 1V confirm line [facility]

 

DATE OF EXAM: 11/27/2019

 

COMPARISON: Today

 

HISTORY: Check line placement.

 

TECHNIQUE: Single frontal view of the chest is obtained.

 

FINDINGS:  Endotracheal tube is 2.7 cm from the saw. There is right central venous catheter with t
ip in the superior vena cava. Nasogastric tube is in the stomach. There is some mild left perihilar p
ulmonary infiltrate. There is no gross heart failure. There are chest leads. Costophrenic angles are 
clear. There is left jugular catheter with the tip in the superior vena cava.

 

 

 

IMPRESSION:  Mild left perihilar infiltrate unchanged. No pneumothorax.

## 2019-11-27 NOTE — ED
General Adult HPI





- General


Chief complaint: Recheck/Abnormal Lab/Rx


Stated complaint: Septic-transfer


Time Seen by Provider: 11/27/19 00:49


Source: patient, family, EMS, old records reviewed


Mode of arrival: EMS


Limitations: altered mental status





- History of Present Illness


Initial comments: 


Marizol is a 74-year-old female who presents the ED today via EMS as a transfer 

from an outside hospital.  Patient was recently admitted to our hospital for 

urinary tract infection, also diagnosed with a left lower extremity DVT.  Family

reports since discharge from the hospital 4 days ago the patient has not been 

eating or drinking well, she's been pretty confused.  Today she seemed much more

confused and appeared quite unwell at which time EMS was contacted.  Family 

requested patient be brought straight to this facility however she was 

profoundly hypotensive and altered and decision was made to take her to the 

local hospital for evaluation.  Septic workup was initiated outside hospital, 

patient received a gentle IV fluid bolus however chest x-ray revealed pulmonary 

edema decision was made to give Lasix and start Levaquin head.  Patient started 

on pressors and transferred to our facility for further care.  Upon arrival 

patient remains hypotensive she is very sleepy, she rouses to voice or touch, 

answers questions in short answers and falls back asleep and has snoring 

respirations.  History is limited by the patient's condition.





- Related Data


                                Home Medications











 Medication  Instructions  Recorded  Confirmed


 


Meloxicam [Mobic] 7.5 mg PO BID 11/04/19 11/19/19


 


Nortriptyline [Pamelor] 10 mg PO HS 11/04/19 11/19/19


 


Nystatin-Triamcinolone Oint 1 applic TOPICAL BID PRN 11/04/19 11/19/19





[Mycolog 100,000-0.1 Unit/gm-%   





Oint]   


 


Omeprazole 20 mg PO DAILY 11/04/19 11/19/19


 


Prochlorperazine [Compazine] 10 mg PO Q6H PRN 11/04/19 11/19/19


 


Sertraline [Zoloft] 100 mg PO BID 11/04/19 11/19/19


 


Tafinlar 75mg Capsule 150 mg PO Q12H 11/04/19 11/19/19


 


Trametinib Dimethyl Sulfoxide 2 mg PO DAILY 11/04/19 11/19/19





[Mekinist]   


 


amLODIPine BESYLATE [Norvasc] 2.5 mg PO DAILY 11/04/19 11/19/19


 


Apixaban [Eliquis] 5 mg PO BID 11/19/19 11/19/19


 


HYDROcodone/APAP 5-325MG [Norco 1 tab PO Q4HR PRN 11/19/19 11/19/19





5-325]   








                                  Previous Rx's











 Medication  Instructions  Recorded


 


Sennosides [Senokot] 8.6 mg PO BID 30 Days #60 tab 11/07/19


 


Ipratropium-Albuterol Nebulize 3 ml INHALATION RT-QID #90 11/23/19





[Duoneb 0.5 mg-3 mg/3 ml Soln] ampul.neb 


 


Levofloxacin [Levaquin] 750 mg PO DAILY 4 Days #4 tab 11/23/19











                                    Allergies











Allergy/AdvReac Type Severity Reaction Status Date / Time


 


ciprofloxacin [From Cipro] Allergy  Hallucinati Verified 11/19/19 15:41





   ons  


 


Penicillins Allergy  Unknown Verified 11/19/19 15:41





   Childhood  














Review of Systems


ROS Statement: 


Those systems with pertinent positive or pertinent negative responses have been 

documented in the HPI.





ROS Other: All systems not noted in ROS Statement are negative.





Past Medical History


Past Medical History: Cancer, Deep Vein Thrombosis (DVT), Hyperlipidemia, H

ypertension, Pneumonia, Rheumatoid Arthritis (RA)


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 

spleen, kidney, back, lung , presently DVT left leg


History of Any Multi-Drug Resistant Organisms: None Reported


Past Surgical History: Bladder Surgery, Hernia Repair, Hysterectomy, Orthopedic 

Surgery


Additional Past Surgical History / Comment(s): tumor removal right breast, 3 

knee replacements, T& A, tropean eye surgery


Past Anesthesia/Blood Transfusion Reactions: No Reported Reaction


Past Psychological History: No Psychological Hx Reported


Smoking Status: Never smoker


Past Alcohol Use History: None Reported


Past Drug Use History: None Reported





- Past Family History


  ** Father


Family Medical History: Cancer


Additional Family Medical History / Comment(s): lung cancer





  ** Mother


Family Medical History: Cancer


Additional Family Medical History / Comment(s): intestinal cancer





  ** Sister(s)


Family Medical History: Cancer


Additional Family Medical History / Comment(s): breast cancer





  ** Family


Additional Family Medical History / Comment(s): Denies history of cancer





General Exam





- General Exam Comments


Initial Comments: 


Physical Exam


GENERAL:


Chronically ill-appearing elderly female, toxic appearance 





HENT:


Normocephalic, Atraumatic. 





EYES:


PERRL, EOMI





PULMONARY:


Crackles more prominent on left than right





CARDIOVASCULAR:


Tachycardic, regular





ABDOMEN:


Soft and nontender with normal bowel sounds. 





SKIN:


Skin is pale, there are bruises on the forearms with previous IV access attempts





: 


Grande catheter in place draining dark tea-colored urine





NEUROLOGIC:


Oriented to self, able to identify that she is in the hospital, uncertain of 

why, uncertain of reason for transfer.  Able to answer yes no questions 

appropriately to confirm past medical history.  Moving all extremities.





MUSCULOSKELETAL:


Lower extremity edema





PSYCHIATRIC:


Unable to assess secondary to medical condition


Limitations: altered mental status





Course


                                   Vital Signs











  11/27/19 11/27/19 11/27/19





  00:42 00:53 01:00


 


Temperature 98.2 F  


 


Pulse Rate 112 H 106 H 126 H


 


Respiratory 28 H 18 14





Rate   


 


Blood Pressure 82/69  


 


O2 Sat by Pulse 96 95 94 L





Oximetry   














  11/27/19 11/27/19 11/27/19





  01:10 01:20 01:30


 


Temperature   


 


Pulse Rate 112 H 111 H 112 H


 


Respiratory 14 16 16





Rate   


 


Blood Pressure 103/52 88/45 101/38


 


O2 Sat by Pulse 97 97 99





Oximetry   














  11/27/19 11/27/19 11/27/19





  01:40 01:50 02:00


 


Temperature   


 


Pulse Rate 115 H 114 H 113 H


 


Respiratory 16 16 16





Rate   


 


Blood Pressure 115/50 103/26 98/60


 


O2 Sat by Pulse 99 99 98





Oximetry   














  11/27/19 11/27/19





  02:30 03:00


 


Temperature  


 


Pulse Rate 112 H 120 H


 


Respiratory 16 16





Rate  


 


Blood Pressure 102/56 99/58


 


O2 Sat by Pulse 97 100





Oximetry  














Procedures





- Central Line Placement


  ** Left IJ


Consent Obtained: verbal consent


Patient Placed on Monitor/Pulse Ox: Yes


MD Prep: mask, gown, gloves


Central Line Prep: Chlorhexidine scrub


Local Anesthesia Used: Lidocaine 1%


Amount of Anesthesia Used (mls): 4


Ultrasound Used for Placement: Yes


Central Line Lumen Inserted: triple


Bloods Obtained for Lab: Yes


Central Line Position: good blood return, all ports aspirated, flushed, capped, 

sutured in place with 3-0 nylon


Dressing Applied: sterile gauze/tape


Post Procedure X-Ray: tip of catheter in good position


Patient Tolerated Procedure: well


Complications: hematoma at puncture site





- Intubation


Sedative: Etomidate


Paralytic: Rocuronium


Laryngoscope: Jean Paul


Size: 3


ET Tube Size: 7.5


ET Tube Uncuffed: No


Tube Secured Depth (cm): 21


Tube Secured Location: teeth


Tube Placement Confirmation: visualized tube passing through cords, equal breath

sounds bilaterally, no breath sounds over epigastrium, confirmation by capnome

try


Patient Tolerated Procedure: no complications





- Sepsis


  ** Sepsis Focused Exam #1


Time Sepsis Criteria Met: 01:00


Sepsis Focused Exam Date: 11/27/19


Sepsis Focused Exam Time: 02:30


Sepsis Focused Exam Complete: Yes


Vital Signs & RN Notes Reviewed: Yes


Capillary Refill: < 2 Seconds: Fingers, Toes


Peripheral Pulses: Weak: Radial (R), Radial (L)


Skin Color: Normal for Patient


Respiratory Exam: rales, rhonchi


Cardiovascular Exam: tachycardia





Medical Decision Making





- Medical Decision Making


Patient care was discussed with transferring physician prior to transfer.  This 

is an elderly female with metastatic melanoma, presenting to an outside facility

and what appears to be septic shock.  Patient's  awake and talking decision was 

made not to intubate at the outside facility.  She was given IV fluid bolus at 

outside facility as well as Lasix for treatment of pulmonary edema.





The patient was seen and evaluated immediately upon arrival to our facility, 

patient was noted to be quite lethargic, hypotensive, tachycardic decision was 

made to intubate for airway protection and to allow the patient to rest.


Patient was intubated with 7.5 tube, no complications with intubation





Patient has a right-sided port, however given the patient's likely in the 

multiple antibiotics, fluids, pressors decision was made to establish a 

secondary central line.  A left IJ was placed.  Patient was noted to develop a 

small hematoma at the site of insertion.





Labs resulted with multiple significant abnormalities, profound leukocytosis, 

hyponatremia, hyperkalemia which was treated, acute kidney injury with elevated 

BUN consistent with prerenal azotemia.  Additional IV fluids were ordered.  

Patient was noted to have much lighter colored urine draining from the Grande 

catheter after  bolus.





The patient received a total of 1 L prior to arrival as well as a dose of 

cefepime, in addition she was given 2 L while in the emergency department and 

placed on IV fluids at 150 per hour.





Patient care was discussed with intensivist Dr. Greenberg and who agrees with 

plan for admission for septic shock secondary to urinary tract infection and 

likely pneumonia.


Patient care was discussed with admitting physician Dr. Mac who agrees with 

plan for admission to the ICU for septic shock.








- Lab Data


Result diagrams: 


                                 11/27/19 00:50





                                 11/27/19 00:50


                                   Lab Results











  11/27/19 11/27/19 11/27/19 Range/Units





  00:50 00:50 00:50 


 


WBC   22.8 H   (3.8-10.6)  k/uL


 


RBC   4.76   (3.80-5.40)  m/uL


 


Hgb   13.5   (11.4-16.0)  gm/dL


 


Hct   42.8   (34.0-46.0)  %


 


MCV   90.0   (80.0-100.0)  fL


 


MCH   28.4   (25.0-35.0)  pg


 


MCHC   31.6   (31.0-37.0)  g/dL


 


RDW   16.1 H   (11.5-15.5)  %


 


Plt Count   180   (150-450)  k/uL


 


Neutrophils %   82   %


 


Lymphocytes %   11   %


 


Monocytes %   6   %


 


Eosinophils %   0   %


 


Basophils %   1   %


 


Neutrophils #   18.8 H   (1.3-7.7)  k/uL


 


Lymphocytes #   2.4   (1.0-4.8)  k/uL


 


Monocytes #   1.3 H   (0-1.0)  k/uL


 


Eosinophils #   0.0   (0-0.7)  k/uL


 


Basophils #   0.1   (0-0.2)  k/uL


 


Hypochromasia   Slight   


 


Anisocytosis   Slight   


 


PT     (9.0-12.0)  sec


 


INR     (<1.2)  


 


APTT     (22.0-30.0)  sec


 


VBG pH  7.33    (7.31-7.41)  


 


VBG pCO2  34 L    (37-51)  mmHg


 


VBG HCO3  17 L    (24-28)  mmol/L


 


Sodium    130 L  (137-145)  mmol/L


 


Potassium    5.8 H  (3.5-5.1)  mmol/L


 


Chloride    100  ()  mmol/L


 


Carbon Dioxide    19 L  (22-30)  mmol/L


 


Anion Gap    11  mmol/L


 


BUN    54 H  (7-17)  mg/dL


 


Creatinine    1.44 H  (0.52-1.04)  mg/dL


 


Est GFR (CKD-EPI)AfAm    42  (>60 ml/min/1.73 sqM)  


 


Est GFR (CKD-EPI)NonAf    36  (>60 ml/min/1.73 sqM)  


 


Glucose    84  (74-99)  mg/dL


 


Plasma Lactic Acid Yomi     (0.7-2.0)  mmol/L


 


Calcium    7.5 L  (8.4-10.2)  mg/dL


 


Total Bilirubin    2.9 H  (0.2-1.3)  mg/dL


 


AST    1799 H  (14-36)  U/L


 


ALT    189 H  (9-52)  U/L


 


Alkaline Phosphatase    296 H  ()  U/L


 


Creatine Kinase    204 H  ()  U/L


 


Troponin I     (0.000-0.034)  ng/mL


 


Total Protein    4.7 L  (6.3-8.2)  g/dL


 


Albumin    2.3 L  (3.5-5.0)  g/dL


 


Urine Color     


 


Urine Appearance     (Clear)  


 


Urine pH     (5.0-8.0)  


 


Ur Specific Gravity     (1.001-1.035)  


 


Urine Protein     (Negative)  


 


Urine Glucose (UA)     (Negative)  


 


Urine Ketones     (Negative)  


 


Urine Blood     (Negative)  


 


Urine Nitrite     (Negative)  


 


Urine Bilirubin     (Negative)  


 


Urine Urobilinogen     (<2.0)  mg/dL


 


Ur Leukocyte Esterase     (Negative)  


 


Urine RBC     (0-5)  /hpf


 


Urine WBC     (0-5)  /hpf


 


Urine WBC Clumps     (None)  /hpf


 


Ur Squamous Epith Cells     (0-4)  /hpf


 


Amorphous Sediment     (None)  /hpf


 


Urine Bacteria     (None)  /hpf


 


Hyaline Casts     (0-2)  /lpf


 


Urine Mucus     (None)  /hpf


 


Urine Yeast (Budding)     (None)  /hpf














  11/27/19 11/27/19 11/27/19 Range/Units





  00:50 00:50 00:50 


 


WBC     (3.8-10.6)  k/uL


 


RBC     (3.80-5.40)  m/uL


 


Hgb     (11.4-16.0)  gm/dL


 


Hct     (34.0-46.0)  %


 


MCV     (80.0-100.0)  fL


 


MCH     (25.0-35.0)  pg


 


MCHC     (31.0-37.0)  g/dL


 


RDW     (11.5-15.5)  %


 


Plt Count     (150-450)  k/uL


 


Neutrophils %     %


 


Lymphocytes %     %


 


Monocytes %     %


 


Eosinophils %     %


 


Basophils %     %


 


Neutrophils #     (1.3-7.7)  k/uL


 


Lymphocytes #     (1.0-4.8)  k/uL


 


Monocytes #     (0-1.0)  k/uL


 


Eosinophils #     (0-0.7)  k/uL


 


Basophils #     (0-0.2)  k/uL


 


Hypochromasia     


 


Anisocytosis     


 


PT   12.3 H   (9.0-12.0)  sec


 


INR   1.2 H   (<1.2)  


 


APTT   23.5   (22.0-30.0)  sec


 


VBG pH     (7.31-7.41)  


 


VBG pCO2     (37-51)  mmHg


 


VBG HCO3     (24-28)  mmol/L


 


Sodium     (137-145)  mmol/L


 


Potassium     (3.5-5.1)  mmol/L


 


Chloride     ()  mmol/L


 


Carbon Dioxide     (22-30)  mmol/L


 


Anion Gap     mmol/L


 


BUN     (7-17)  mg/dL


 


Creatinine     (0.52-1.04)  mg/dL


 


Est GFR (CKD-EPI)AfAm     (>60 ml/min/1.73 sqM)  


 


Est GFR (CKD-EPI)NonAf     (>60 ml/min/1.73 sqM)  


 


Glucose     (74-99)  mg/dL


 


Plasma Lactic Acid Yomi  2.8 H*    (0.7-2.0)  mmol/L


 


Calcium     (8.4-10.2)  mg/dL


 


Total Bilirubin     (0.2-1.3)  mg/dL


 


AST     (14-36)  U/L


 


ALT     (9-52)  U/L


 


Alkaline Phosphatase     ()  U/L


 


Creatine Kinase     ()  U/L


 


Troponin I    0.048 H*  (0.000-0.034)  ng/mL


 


Total Protein     (6.3-8.2)  g/dL


 


Albumin     (3.5-5.0)  g/dL


 


Urine Color     


 


Urine Appearance     (Clear)  


 


Urine pH     (5.0-8.0)  


 


Ur Specific Gravity     (1.001-1.035)  


 


Urine Protein     (Negative)  


 


Urine Glucose (UA)     (Negative)  


 


Urine Ketones     (Negative)  


 


Urine Blood     (Negative)  


 


Urine Nitrite     (Negative)  


 


Urine Bilirubin     (Negative)  


 


Urine Urobilinogen     (<2.0)  mg/dL


 


Ur Leukocyte Esterase     (Negative)  


 


Urine RBC     (0-5)  /hpf


 


Urine WBC     (0-5)  /hpf


 


Urine WBC Clumps     (None)  /hpf


 


Ur Squamous Epith Cells     (0-4)  /hpf


 


Amorphous Sediment     (None)  /hpf


 


Urine Bacteria     (None)  /hpf


 


Hyaline Casts     (0-2)  /lpf


 


Urine Mucus     (None)  /hpf


 


Urine Yeast (Budding)     (None)  /hpf














  11/27/19 Range/Units





  01:21 


 


WBC   (3.8-10.6)  k/uL


 


RBC   (3.80-5.40)  m/uL


 


Hgb   (11.4-16.0)  gm/dL


 


Hct   (34.0-46.0)  %


 


MCV   (80.0-100.0)  fL


 


MCH   (25.0-35.0)  pg


 


MCHC   (31.0-37.0)  g/dL


 


RDW   (11.5-15.5)  %


 


Plt Count   (150-450)  k/uL


 


Neutrophils %   %


 


Lymphocytes %   %


 


Monocytes %   %


 


Eosinophils %   %


 


Basophils %   %


 


Neutrophils #   (1.3-7.7)  k/uL


 


Lymphocytes #   (1.0-4.8)  k/uL


 


Monocytes #   (0-1.0)  k/uL


 


Eosinophils #   (0-0.7)  k/uL


 


Basophils #   (0-0.2)  k/uL


 


Hypochromasia   


 


Anisocytosis   


 


PT   (9.0-12.0)  sec


 


INR   (<1.2)  


 


APTT   (22.0-30.0)  sec


 


VBG pH   (7.31-7.41)  


 


VBG pCO2   (37-51)  mmHg


 


VBG HCO3   (24-28)  mmol/L


 


Sodium   (137-145)  mmol/L


 


Potassium   (3.5-5.1)  mmol/L


 


Chloride   ()  mmol/L


 


Carbon Dioxide   (22-30)  mmol/L


 


Anion Gap   mmol/L


 


BUN   (7-17)  mg/dL


 


Creatinine   (0.52-1.04)  mg/dL


 


Est GFR (CKD-EPI)AfAm   (>60 ml/min/1.73 sqM)  


 


Est GFR (CKD-EPI)NonAf   (>60 ml/min/1.73 sqM)  


 


Glucose   (74-99)  mg/dL


 


Plasma Lactic Acid Yomi   (0.7-2.0)  mmol/L


 


Calcium   (8.4-10.2)  mg/dL


 


Total Bilirubin   (0.2-1.3)  mg/dL


 


AST   (14-36)  U/L


 


ALT   (9-52)  U/L


 


Alkaline Phosphatase   ()  U/L


 


Creatine Kinase   ()  U/L


 


Troponin I   (0.000-0.034)  ng/mL


 


Total Protein   (6.3-8.2)  g/dL


 


Albumin   (3.5-5.0)  g/dL


 


Urine Color  Dark Brown  


 


Urine Appearance  Cloudy H  (Clear)  


 


Urine pH  5.5  (5.0-8.0)  


 


Ur Specific Gravity  1.021  (1.001-1.035)  


 


Urine Protein  2+ H  (Negative)  


 


Urine Glucose (UA)  Negative  (Negative)  


 


Urine Ketones  Negative  (Negative)  


 


Urine Blood  Large H  (Negative)  


 


Urine Nitrite  Negative  (Negative)  


 


Urine Bilirubin  1+ H  (Negative)  


 


Urine Urobilinogen  4.0  (<2.0)  mg/dL


 


Ur Leukocyte Esterase  Small H  (Negative)  


 


Urine RBC  >182 H  (0-5)  /hpf


 


Urine WBC  33 H  (0-5)  /hpf


 


Urine WBC Clumps  Few H  (None)  /hpf


 


Ur Squamous Epith Cells  4  (0-4)  /hpf


 


Amorphous Sediment  Rare H  (None)  /hpf


 


Urine Bacteria  Occasional H  (None)  /hpf


 


Hyaline Casts  101 H  (0-2)  /lpf


 


Urine Mucus  Many H  (None)  /hpf


 


Urine Yeast (Budding)  Occasional H  (None)  /hpf














Critical Care Time


Critical Care Time: Yes


Total Critical Care Time: 45


Critical Care Time: 


Critical Care Time 


Critical care time was exclusive of separately billable procedures and treating 

other patients and teaching time. 


Critical care was necessary to treat or prevent imminent or life-threatening 

deterioration. 


Given the critical condition in which the patient arrived, the patient was 

immediately assessed by myself and the nurse, and cardiac monitoring initiated 

due to the potential for rapid decompensation of the patient's clinical 

condition. During the course of the patients stay, I spent a considerable amount

of time at the bedside performing serial re-evaluations of the patient's 

hemodynamic and clinical status because of the recognized potential threat to 

life or limb in this condition. I then had a chance to review not only all of 

the available current laboratory and radiographic studies obtained today, but I 

also reviewed old records available to me at the time. Additionally, any 

ancillary information available including paramedic records were reviewed. 

Sequential vital signs were obtained. 








Disposition


Clinical Impression: 


 Acute DVT (deep venous thrombosis), Metastatic melanoma, Respiratory 

insufficiency, UTI (urinary tract infection), LFT elevation, Melanoma metastatic

to lung, Pneumonia, Septic shock





Disposition: ADMITTED AS IP TO THIS HOSP


Condition: Critical


Is patient prescribed a controlled substance at d/c from ED?: No

## 2019-11-27 NOTE — P.PN
Subjective


Progress Note Date: 11/27/19 (delayed charting seen at 0900)


Principal diagnosis: 


Letheragy


Patient is a 74-year-old  female with a past medical history of 

metastatic melanoma with metastases to the lungs, liver, and adrenal gland 

(following with Dr. Arellano for 18 month), recent left lower extremity DVT, 

hypertension, and dyslipidemia who presented to the emergency department 

secondary to lethargy and confusion.  Patient had been discharged from [facility] on 11/23 after a five-day hospitalization for pneumonia and urinary 

tract infection.  Patient was discharged home on Levaquin.  





Melanoma History: The patient was seen for lesion in her right upper back in 

October 2018 which was subsequently removed and showed malignant melanoma.  At 

that point in time she was referred to [facility] and had a wide 

excision with sentinel node biopsy on 12/6/18.  Initial diagnosis was stage 

IIIc, PT4 N 3b.  She was recommended for adjuvant chemotherapy and received 

Nivolumab.  She desired continue treatment closer to home and was subsequently 

seen by Dr. Arellano.  Repeat imaging showed progression of disease and she was seen 

back at [facility].  They recommended to proceed with Opdivo and she

received 7 cycles.  PET scan performed on 8/17/19 showed increasing size of her 

right upper back mass as well as bilateral lung nodules some new and some 

increased in size, spleen lesion, and possible liver uptake.  Case was again 

discussed with [facility] and she received 2 more cycles off Depo.  

Repeat CT on 10/16/19 shows progression of multiple bilateral lung nodules, 20 2

subcutaneous nodules, and metastatic lesions in the liver bilateral adrenal 

glands, and spleen.  She received 1 course of Tafinlar-Mekinist. repeat CT on 

11/20 confirmed new splenomegaly sincee PET/CT, liver lesion, adrenal mass vs 

hemorrhage, and lung lesions.  





She presented to outside facility and was on the profoundly hypotensive.  She 

was given IV fluids and Lasix for treatment of pulmonary edema.  When she 

arrived to the ER here she was very lethargic, hypotensive, and tachycardic.  

She was subsequently intubated for airway protection.  She was found to have a 

white blood cell count 24,000, hyponatremia, hyperkalemia, HPI, and a chest x-

ray showing pulmonary interstitial edema with infiltrate.  She was also found to

have an elevated lactic acid at 2.8 and urinalysis demonstrated large amounts of

blood but no definitive infection.  She was started on broad-spectrum anti

biotics in the form of vancomycin and cefepime.  She received a total of 4 L of 

IV fluids and ultimately required norepinephrine to support her blood pressure. 

She was placed on propofol. 





Patient seen and examined at bedside. She is sedated on vent and not following 

commands. No family present. 











Objective





- Vital Signs


Vital signs: 


                                   Vital Signs











Temp  99.5 F   11/27/19 16:00


 


Pulse  109 H  11/27/19 16:00


 


Resp  22   11/27/19 16:00


 


BP  100/34   11/27/19 13:30


 


Pulse Ox  94 L  11/27/19 16:00








                                 Intake & Output











 11/26/19 11/27/19 11/27/19





 18:59 06:59 18:59


 


Intake Total  1173.688 1638.493


 


Output Total  140 132


 


Balance  0975.748 9018.493


 


Weight  75.296 kg 84.9 kg


 


Intake:   


 


  IV  1550 5750


 


    Aztreonam 2 gm In Sodium  100 





    Chloride 0.9% 100 ml @   





    100 mls/hr IVPB Q8H ROLO   





    Rx#:[id number]   


 


    Sodium Chloride 0.9% 1,  450 1500





    000 ml @ 150 mls/hr IV .   





    Q6H40M ROLO Rx#:[id number]   


 


    Sodium Chloride 0.9% 1,  1000 4000





    000 ml @ 999 mls/hr IV .   





    Q1H1M ONE Rx#:[id number]   


 


    Vancomycin 1,500 mg In   250





    Sodium Chloride 0.9% 250   





    ml @ 125 mls/hr IVPB Q24H   





    ROLO Rx#:[id number]   


 


  Intake, IV Titration  86.467 271.493





  Amount   


 


    Cefepime 2 gm In Sodium   100





    Chloride 0.9% 100 ml @   





    200 mls/hr IVPB Q12H ROLO   





    Rx#:[id number]   


 


    Norepinephrine 32 mg In  14.383 109.375





    Sodium Chloride 0.9% 218   





    ml @ 0.05 MCG/KG/MIN 1.   





    765 mls/hr IV .Q24H ROLO   





    Rx#:[id number]   


 


    Propofol 1,000 mg In  53.385 62.118





    Empty Bag 1 bag @ Titrate   





    IV .Q0M ROLO Rx#:   





    [id number]   


 


    fentaNYL (PF) 1,000 mcg  18.699 





    In Sodium Chloride 0.9%   





    80 ml @ 1 MCG/KG/HR 7.53   





    mls/hr IV .[id number] [facility] Rx   





    #:[id number]   


 


Output:   


 


  Urine  140 132


 


Other:   


 


  Voiding Method  Indwelling Catheter Indwelling Catheter








                       ABP, PAP, CO, CI - Last Documented











Arterial Blood Pressure        80/53

















- Exam


General: ill appearing, moderate distress, appears older than stated age


Derm: warm, dry


Head: atraumatic, normocephalic, symmetric


Eyes: PERRL, no lid lesion, anicteric sclera


Mouth: no lip lesion, mucus membranes dry


Cardiovascular: S1S2 reg, no murmur, positive posterior tibial pulse bilateral, 


Lungs: Ccourse bs bilateral , no accessory muscle use, on vent


Abdominal: soft,  nontender to palpation, no guarding, no appreciable organome

lois


Ext: + gross muscle atrophy, 1+ edema, no contractures


Neuro: breathing over vent, + cough


Psych:Sedated on vent











- Labs


CBC & Chem 7: 


                                 11/27/19 09:01





                                 11/27/19 04:49


Labs: 


                  Abnormal Lab Results - Last 24 Hours (Table)











  11/27/19 11/27/19 11/27/19 Range/Units





  00:50 00:50 00:50 


 


WBC   22.8 H   (3.8-10.6)  k/uL


 


Hgb     (11.4-16.0)  gm/dL


 


RDW   16.1 H   (11.5-15.5)  %


 


Neutrophils #   18.8 H   (1.3-7.7)  k/uL


 


Monocytes #   1.3 H   (0-1.0)  k/uL


 


PT     (9.0-12.0)  sec


 


INR     (<1.2)  


 


ABG pH     (7.35-7.45)  


 


ABG pO2     ()  mmHg


 


ABG HCO3     (21-25)  mmol/L


 


ABG Total CO2     (19-24)  mmol/L


 


ABG O2 Saturation     (94-97)  %


 


VBG pCO2  34 L    (37-51)  mmHg


 


VBG HCO3  17 L    (24-28)  mmol/L


 


Sodium    130 L  (137-145)  mmol/L


 


Potassium    5.8 H  (3.5-5.1)  mmol/L


 


Carbon Dioxide    19 L  (22-30)  mmol/L


 


BUN    54 H  (7-17)  mg/dL


 


Creatinine    1.44 H  (0.52-1.04)  mg/dL


 


POC Glucose (mg/dL)     (75-99)  mg/dL


 


Plasma Lactic Acid Yomi     (0.7-2.0)  mmol/L


 


Calcium    7.5 L  (8.4-10.2)  mg/dL


 


Total Bilirubin    2.9 H  (0.2-1.3)  mg/dL


 


AST    1799 H  (14-36)  U/L


 


ALT    189 H  (9-52)  U/L


 


Alkaline Phosphatase    296 H  ()  U/L


 


Creatine Kinase    204 H  ()  U/L


 


Troponin I     (0.000-0.034)  ng/mL


 


Total Protein    4.7 L  (6.3-8.2)  g/dL


 


Albumin    2.3 L  (3.5-5.0)  g/dL


 


Urine Appearance     (Clear)  


 


Urine Protein     (Negative)  


 


Urine Blood     (Negative)  


 


Urine Bilirubin     (Negative)  


 


Ur Leukocyte Esterase     (Negative)  


 


Urine RBC     (0-5)  /hpf


 


Urine WBC     (0-5)  /hpf


 


Urine WBC Clumps     (None)  /hpf


 


Amorphous Sediment     (None)  /hpf


 


Urine Bacteria     (None)  /hpf


 


Hyaline Casts     (0-2)  /lpf


 


Urine Mucus     (None)  /hpf


 


Urine Yeast (Budding)     (None)  /hpf














  11/27/19 11/27/19 11/27/19 Range/Units





  00:50 00:50 00:50 


 


WBC     (3.8-10.6)  k/uL


 


Hgb     (11.4-16.0)  gm/dL


 


RDW     (11.5-15.5)  %


 


Neutrophils #     (1.3-7.7)  k/uL


 


Monocytes #     (0-1.0)  k/uL


 


PT   12.3 H   (9.0-12.0)  sec


 


INR   1.2 H   (<1.2)  


 


ABG pH     (7.35-7.45)  


 


ABG pO2     ()  mmHg


 


ABG HCO3     (21-25)  mmol/L


 


ABG Total CO2     (19-24)  mmol/L


 


ABG O2 Saturation     (94-97)  %


 


VBG pCO2     (37-51)  mmHg


 


VBG HCO3     (24-28)  mmol/L


 


Sodium     (137-145)  mmol/L


 


Potassium     (3.5-5.1)  mmol/L


 


Carbon Dioxide     (22-30)  mmol/L


 


BUN     (7-17)  mg/dL


 


Creatinine     (0.52-1.04)  mg/dL


 


POC Glucose (mg/dL)     (75-99)  mg/dL


 


Plasma Lactic Acid Yomi  2.8 H*    (0.7-2.0)  mmol/L


 


Calcium     (8.4-10.2)  mg/dL


 


Total Bilirubin     (0.2-1.3)  mg/dL


 


AST     (14-36)  U/L


 


ALT     (9-52)  U/L


 


Alkaline Phosphatase     ()  U/L


 


Creatine Kinase     ()  U/L


 


Troponin I    0.048 H*  (0.000-0.034)  ng/mL


 


Total Protein     (6.3-8.2)  g/dL


 


Albumin     (3.5-5.0)  g/dL


 


Urine Appearance     (Clear)  


 


Urine Protein     (Negative)  


 


Urine Blood     (Negative)  


 


Urine Bilirubin     (Negative)  


 


Ur Leukocyte Esterase     (Negative)  


 


Urine RBC     (0-5)  /hpf


 


Urine WBC     (0-5)  /hpf


 


Urine WBC Clumps     (None)  /hpf


 


Amorphous Sediment     (None)  /hpf


 


Urine Bacteria     (None)  /hpf


 


Hyaline Casts     (0-2)  /lpf


 


Urine Mucus     (None)  /hpf


 


Urine Yeast (Budding)     (None)  /hpf














  11/27/19 11/27/19 11/27/19 Range/Units





  01:21 03:22 04:39 


 


WBC     (3.8-10.6)  k/uL


 


Hgb     (11.4-16.0)  gm/dL


 


RDW     (11.5-15.5)  %


 


Neutrophils #     (1.3-7.7)  k/uL


 


Monocytes #     (0-1.0)  k/uL


 


PT     (9.0-12.0)  sec


 


INR     (<1.2)  


 


ABG pH    7.24 L  (7.35-7.45)  


 


ABG pO2    301 H  ()  mmHg


 


ABG HCO3    17 L  (21-25)  mmol/L


 


ABG Total CO2    18 L  (19-24)  mmol/L


 


ABG O2 Saturation    99.1 H  (94-97)  %


 


VBG pCO2     (37-51)  mmHg


 


VBG HCO3     (24-28)  mmol/L


 


Sodium     (137-145)  mmol/L


 


Potassium     (3.5-5.1)  mmol/L


 


Carbon Dioxide     (22-30)  mmol/L


 


BUN     (7-17)  mg/dL


 


Creatinine     (0.52-1.04)  mg/dL


 


POC Glucose (mg/dL)   114 H   (75-99)  mg/dL


 


Plasma Lactic Acid Yomi     (0.7-2.0)  mmol/L


 


Calcium     (8.4-10.2)  mg/dL


 


Total Bilirubin     (0.2-1.3)  mg/dL


 


AST     (14-36)  U/L


 


ALT     (9-52)  U/L


 


Alkaline Phosphatase     ()  U/L


 


Creatine Kinase     ()  U/L


 


Troponin I     (0.000-0.034)  ng/mL


 


Total Protein     (6.3-8.2)  g/dL


 


Albumin     (3.5-5.0)  g/dL


 


Urine Appearance  Cloudy H    (Clear)  


 


Urine Protein  2+ H    (Negative)  


 


Urine Blood  Large H    (Negative)  


 


Urine Bilirubin  1+ H    (Negative)  


 


Ur Leukocyte Esterase  Small H    (Negative)  


 


Urine RBC  >182 H    (0-5)  /hpf


 


Urine WBC  33 H    (0-5)  /hpf


 


Urine WBC Clumps  Few H    (None)  /hpf


 


Amorphous Sediment  Rare H    (None)  /hpf


 


Urine Bacteria  Occasional H    (None)  /hpf


 


Hyaline Casts  101 H    (0-2)  /lpf


 


Urine Mucus  Many H    (None)  /hpf


 


Urine Yeast (Budding)  Occasional H    (None)  /hpf














  11/27/19 11/27/19 11/27/19 Range/Units





  04:49 04:49 05:30 


 


WBC  24.5 H    (3.8-10.6)  k/uL


 


Hgb  11.2 L    (11.4-16.0)  gm/dL


 


RDW  15.8 H    (11.5-15.5)  %


 


Neutrophils #  19.8 H    (1.3-7.7)  k/uL


 


Monocytes #  1.4 H    (0-1.0)  k/uL


 


PT     (9.0-12.0)  sec


 


INR     (<1.2)  


 


ABG pH     (7.35-7.45)  


 


ABG pO2     ()  mmHg


 


ABG HCO3     (21-25)  mmol/L


 


ABG Total CO2     (19-24)  mmol/L


 


ABG O2 Saturation     (94-97)  %


 


VBG pCO2     (37-51)  mmHg


 


VBG HCO3     (24-28)  mmol/L


 


Sodium   131 L   (137-145)  mmol/L


 


Potassium     (3.5-5.1)  mmol/L


 


Carbon Dioxide   17 L   (22-30)  mmol/L


 


BUN   50 H   (7-17)  mg/dL


 


Creatinine   1.25 H   (0.52-1.04)  mg/dL


 


POC Glucose (mg/dL)     (75-99)  mg/dL


 


Plasma Lactic Acid Yomi    2.3 H*  (0.7-2.0)  mmol/L


 


Calcium   6.5 L   (8.4-10.2)  mg/dL


 


Total Bilirubin   2.8 H   (0.2-1.3)  mg/dL


 


AST   1491 H   (14-36)  U/L


 


ALT   154 H   (9-52)  U/L


 


Alkaline Phosphatase   230 H   ()  U/L


 


Creatine Kinase     ()  U/L


 


Troponin I     (0.000-0.034)  ng/mL


 


Total Protein   3.6 L   (6.3-8.2)  g/dL


 


Albumin   1.7 L   (3.5-5.0)  g/dL


 


Urine Appearance     (Clear)  


 


Urine Protein     (Negative)  


 


Urine Blood     (Negative)  


 


Urine Bilirubin     (Negative)  


 


Ur Leukocyte Esterase     (Negative)  


 


Urine RBC     (0-5)  /hpf


 


Urine WBC     (0-5)  /hpf


 


Urine WBC Clumps     (None)  /hpf


 


Amorphous Sediment     (None)  /hpf


 


Urine Bacteria     (None)  /hpf


 


Hyaline Casts     (0-2)  /lpf


 


Urine Mucus     (None)  /hpf


 


Urine Yeast (Budding)     (None)  /hpf














  11/27/19 11/27/19 11/27/19 Range/Units





  05:30 09:01 09:01 


 


WBC   24.4 H   (3.8-10.6)  k/uL


 


Hgb     (11.4-16.0)  gm/dL


 


RDW   15.9 H   (11.5-15.5)  %


 


Neutrophils #   20.0 H   (1.3-7.7)  k/uL


 


Monocytes #     (0-1.0)  k/uL


 


PT    12.8 H  (9.0-12.0)  sec


 


INR    1.2 H  (<1.2)  


 


ABG pH     (7.35-7.45)  


 


ABG pO2     ()  mmHg


 


ABG HCO3     (21-25)  mmol/L


 


ABG Total CO2     (19-24)  mmol/L


 


ABG O2 Saturation     (94-97)  %


 


VBG pCO2     (37-51)  mmHg


 


VBG HCO3     (24-28)  mmol/L


 


Sodium     (137-145)  mmol/L


 


Potassium     (3.5-5.1)  mmol/L


 


Carbon Dioxide     (22-30)  mmol/L


 


BUN     (7-17)  mg/dL


 


Creatinine     (0.52-1.04)  mg/dL


 


POC Glucose (mg/dL)     (75-99)  mg/dL


 


Plasma Lactic Acid Yomi     (0.7-2.0)  mmol/L


 


Calcium     (8.4-10.2)  mg/dL


 


Total Bilirubin     (0.2-1.3)  mg/dL


 


AST     (14-36)  U/L


 


ALT     (9-52)  U/L


 


Alkaline Phosphatase     ()  U/L


 


Creatine Kinase     ()  U/L


 


Troponin I  0.046 H*    (0.000-0.034)  ng/mL


 


Total Protein     (6.3-8.2)  g/dL


 


Albumin     (3.5-5.0)  g/dL


 


Urine Appearance     (Clear)  


 


Urine Protein     (Negative)  


 


Urine Blood     (Negative)  


 


Urine Bilirubin     (Negative)  


 


Ur Leukocyte Esterase     (Negative)  


 


Urine RBC     (0-5)  /hpf


 


Urine WBC     (0-5)  /hpf


 


Urine WBC Clumps     (None)  /hpf


 


Amorphous Sediment     (None)  /hpf


 


Urine Bacteria     (None)  /hpf


 


Hyaline Casts     (0-2)  /lpf


 


Urine Mucus     (None)  /hpf


 


Urine Yeast (Budding)     (None)  /hpf














  11/27/19 11/27/19 Range/Units





  10:16 13:01 


 


WBC    (3.8-10.6)  k/uL


 


Hgb    (11.4-16.0)  gm/dL


 


RDW    (11.5-15.5)  %


 


Neutrophils #    (1.3-7.7)  k/uL


 


Monocytes #    (0-1.0)  k/uL


 


PT    (9.0-12.0)  sec


 


INR    (<1.2)  


 


ABG pH    (7.35-7.45)  


 


ABG pO2    ()  mmHg


 


ABG HCO3    (21-25)  mmol/L


 


ABG Total CO2    (19-24)  mmol/L


 


ABG O2 Saturation    (94-97)  %


 


VBG pCO2    (37-51)  mmHg


 


VBG HCO3    (24-28)  mmol/L


 


Sodium    (137-145)  mmol/L


 


Potassium    (3.5-5.1)  mmol/L


 


Carbon Dioxide    (22-30)  mmol/L


 


BUN    (7-17)  mg/dL


 


Creatinine    (0.52-1.04)  mg/dL


 


POC Glucose (mg/dL)   73 L  (75-99)  mg/dL


 


Plasma Lactic Acid Yomi  2.1 H*   (0.7-2.0)  mmol/L


 


Calcium    (8.4-10.2)  mg/dL


 


Total Bilirubin    (0.2-1.3)  mg/dL


 


AST    (14-36)  U/L


 


ALT    (9-52)  U/L


 


Alkaline Phosphatase    ()  U/L


 


Creatine Kinase    ()  U/L


 


Troponin I    (0.000-0.034)  ng/mL


 


Total Protein    (6.3-8.2)  g/dL


 


Albumin    (3.5-5.0)  g/dL


 


Urine Appearance    (Clear)  


 


Urine Protein    (Negative)  


 


Urine Blood    (Negative)  


 


Urine Bilirubin    (Negative)  


 


Ur Leukocyte Esterase    (Negative)  


 


Urine RBC    (0-5)  /hpf


 


Urine WBC    (0-5)  /hpf


 


Urine WBC Clumps    (None)  /hpf


 


Amorphous Sediment    (None)  /hpf


 


Urine Bacteria    (None)  /hpf


 


Hyaline Casts    (0-2)  /lpf


 


Urine Mucus    (None)  /hpf


 


Urine Yeast (Budding)    (None)  /hpf








                      Microbiology - Last 24 Hours (Table)











 11/27/19 01:21 Urine Culture - Preliminary





 Urine,Voided 














Assessment and Plan


Assessment: 


Septic shock, possible UTI, PNA less likely


- Vanco, cefepime


- IVF


- Levophed


- Urine culture, sputum culture, blood cultures pending





ANABEL likely due to ATN and hypotension 


- IVF fluids


- Avoid additional nephrotoxic agents


- off mobic 


- repeat BMP in AM





Type II MI


- due to septic shock 


- Troponin flat not need to follow 


- treatment of sepsis 





Acute hepatitis


- suspect component of shock liver but may also be disease progression from 

known hepatic lesions 


- follow liver enzymes 





Possible relative adrenal insufficency due to prolonged illness


- tried 2 stress doses of steroids without improvement in BP





Metastatic Melanoma stage IV


- Consult oncology


- Significant disease progression


- has not been able to tolerate treatment 





Lactic acidosis, metabolic acidosis


- likely due to hypoprefusion and decreased clearance due to liver disease


- follow lactic acid





Severe protein calorie malnutrition


- on vent


- tube feeding 


- dietitian consultation





Acute respiratory failure


- vent mgt per Critical care 





Recent left lower extremity DVT


- was on eliquis as outpatient 


- transitioned to heparin as inpatient 





Toxic metabolic encephalopathy


- treatment of illness as listed above





Hyponatremia


- likely due to poor oral intake


- IVF


- Follow BMP in AM





Hyperkalemia, resolved





Chronic: 


HTN


HLD


Rheumatoid arthritis

## 2019-11-27 NOTE — P.HPIM
History of Present Illness


H&P Date: 11/27/19





The patient is a 73 yo F with a PMH of metastatic melanoma (dx ~ 18 months ago, 

following with Dr Arellano), recent LLE DVT (on Eliquis), HTN, and HLD, who was 

recently discharged from [facility] on 11/23 after a 5 day hospitalization for HCAP and

UTI. The patient was discharged home on Levaquin, though as per the 

documentation, the patient had not been eating or drinking well and had been 

confused. No family at the bedside, thereby history obtained from the chart. The

patient had reported become increasingly confused overnight at which time EMS 

was activated. The patient was noted to be hypotensive with concerns for septic 

shock for which she was taken to another facility where she received IM Cefepime

along with 1 L of NS. The patient was subsequently transferred to [facility] ED where 

she was noted to be lethargic and in septic shock. The patient was intubated and

a L IJ was also placed. She received an additional 2 L of NS. She underwent an 

extensive evaluation w/ WBC count 24.5, Hgb 11.2, platelets 169, Na 130, K 5.8, 

CO2 19, BUN 54, Cr 1.44, lactic acid 2.8, T bili 2.9, AST 1799, , Troponi

n 0.048, albumin 2.3, and UA consistent with a UTI. She was admitted to the MICU

for further management of septic shock secondary to HCAP and UTI. 





Review of Systems





Pertinent positives and negatives as discussed in HPI, a complete review of 

systems was performed and all other systems are negative.





Past Medical History


Past Medical History: Cancer, Deep Vein Thrombosis (DVT), Hyperlipidemia, 

Hypertension, Pneumonia, Rheumatoid Arthritis (RA)


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 

spleen, kidney, back, lung , LLE DVT


History of Any Multi-Drug Resistant Organisms: None Reported


Past Surgical History: Bladder Surgery, Hernia Repair, Hysterectomy, Orthopedic 

Surgery


Additional Past Surgical History / Comment(s): tumor removal right breast, 3 

knee replacements, T& A, tropean eye surgery


Past Anesthesia/Blood Transfusion Reactions: No Reported Reaction


Past Psychological History: No Psychological Hx Reported


Smoking Status: Never smoker


Past Alcohol Use History: None Reported


Past Drug Use History: None Reported





- Past Family History


  ** Father


Family Medical History: Cancer


Additional Family Medical History / Comment(s): lung cancer





  ** Mother


Family Medical History: Cancer


Additional Family Medical History / Comment(s): intestinal cancer





  ** Sister(s)


Family Medical History: Cancer


Additional Family Medical History / Comment(s): breast cancer





  ** Family


Additional Family Medical History / Comment(s): Denies history of cancer





Medications and Allergies


                                Home Medications











 Medication  Instructions  Recorded  Confirmed  Type


 


Meloxicam [Mobic] 7.5 mg PO BID 11/04/19 11/19/19 History


 


Nortriptyline [Pamelor] 10 mg PO HS 11/04/19 11/19/19 History


 


Nystatin-Triamcinolone Oint 1 applic TOPICAL BID PRN 11/04/19 11/19/19 History





[Mycolog 100,000-0.1 Unit/gm-%    





Oint]    


 


Omeprazole 20 mg PO DAILY 11/04/19 11/19/19 History


 


Prochlorperazine [Compazine] 10 mg PO Q6H PRN 11/04/19 11/19/19 History


 


Sertraline [Zoloft] 100 mg PO BID 11/04/19 11/19/19 History


 


Tafinlar 75mg Capsule 150 mg PO Q12H 11/04/19 11/19/19 History


 


Trametinib Dimethyl Sulfoxide 2 mg PO DAILY 11/04/19 11/19/19 History





[Mekinist]    


 


amLODIPine BESYLATE [Norvasc] 2.5 mg PO DAILY 11/04/19 11/19/19 History


 


Sennosides [Senokot] 8.6 mg PO BID 30 Days #60 tab 11/07/19 11/19/19 Rx


 


Apixaban [Eliquis] 5 mg PO BID 11/19/19 11/19/19 History


 


HYDROcodone/APAP 5-325MG [Norco 1 tab PO Q4HR PRN 11/19/19 11/19/19 History





5-325]    


 


Ipratropium-Albuterol Nebulize 3 ml INHALATION RT-QID #90 11/23/19  Rx





[Duoneb 0.5 mg-3 mg/3 ml Soln] ampul.neb   


 


Levofloxacin [Levaquin] 750 mg PO DAILY 4 Days #4 tab 11/23/19  Rx








                                    Allergies











Allergy/AdvReac Type Severity Reaction Status Date / Time


 


ciprofloxacin [From Cipro] Allergy  Hallucinati Verified 11/19/19 15:41





   ons  


 


Penicillins Allergy  Unknown Verified 11/19/19 15:41





   Childhood  














Physical Exam


Vitals: 


                                   Vital Signs











  Temp Pulse Resp BP Pulse Ox


 


 11/27/19 05:00   101 H  21   96


 


 11/27/19 04:30  98.6 F  107 H  16   98


 


 11/27/19 04:00   113 H  20  92/63  100


 


 11/27/19 03:30   113 H  22  85/56  94 L


 


 11/27/19 03:00   120 H  16  99/58  98


 


 11/27/19 02:30   112 H  16  102/56  97


 


 11/27/19 02:00   113 H  16  98/60  98


 


 11/27/19 01:50   114 H  16  103/26  99


 


 11/27/19 01:40   115 H  16  115/50  99


 


 11/27/19 01:30   112 H  16  101/38  99


 


 11/27/19 01:20   111 H  16  88/45  97


 


 11/27/19 01:10   112 H  14  103/52  97


 


 11/27/19 01:00   126 H  14   94 L


 


 11/27/19 00:53   106 H  18   95


 


 11/27/19 00:42  98.2 F  112 H  28 H  82/69  96








                                Intake and Output











 11/26/19 11/26/19 11/27/19





 14:59 22:59 06:59


 


Intake Total   1339.783


 


Output Total   130


 


Balance   1209.783


 


Intake:   


 


  IV   1300


 


    Sodium Chloride 0.9% 1,   300





    000 ml @ 150 mls/hr IV .   





    Q6H40M [facility] Rx#:[id number]   


 


    Sodium Chloride 0.9% 1,   1000





    000 ml @ 999 mls/hr IV .   





    Q1H1M [facility] Rx#:[id number]   


 


  Intake, IV Titration   39.783





  Amount   


 


    Norepinephrine 32 mg In   14.383





    Sodium Chloride 0.9% 218   





    ml @ 0.05 MCG/KG/MIN 1.   





    765 mls/hr IV .Q24H [facility]   





    Rx#:[id number]   


 


    Propofol 1,000 mg In   6.701





    Empty Bag 1 bag @ Titrate   





    IV .Q0M [facility] Rx#:   





    [id number]   


 


    fentaNYL (PF) 1,000 mcg   18.699





    In Sodium Chloride 0.9%   





    80 ml @ 1 MCG/KG/HR 7.53   





    mls/hr IV .[id number] [facility] Rx   





    #:[id number]   


 


Output:   


 


  Urine   130


 


Other:   


 


  Weight   75.296 kg








                         ABP, PAP, CO, CI - Last 8 Hours











Arterial Blood Pressure        93/52


 


Arterial Blood Pressure        119/58

















General: Elderly intubated F, no distress, appears at stated age


Derm: no unusual rashes/lesions no unusual ecchymoses, warm, dry


Head: atraumatic, normocephalic, symmetric


Eyes: Anicteric sclera, pupils equal round reactive to light


ENT: Nose and ears atraumatic, ETT in place


Neck: No thyromegaly, no cervical lymphadenopathy, trachea midline, supple


Mouth: no lip lesion


Cardiovascular: Tachycardic, S1/S2 wnll, no murmur, positive posterior tibial pu

lse bilateral, 1+ tom LE edema, capillary refill less than 2 seconds


Lungs: Tom rales with scattered coarse breath sounds, on mech vent


Abdominal: soft, nontender to palpation, no guarding, no appreciable 

organomegaly


Ext: no gross muscle atrophy, unable to assess strength as patient intubated and

not following commands


Neuro: Patient unarousable to sternal rub, unable to perform complete neuro exam


Psych: Patient unarousable to sternal rub





Results


CBC & Chem 7: 


                                 11/27/19 04:49





                                 11/27/19 00:50


Labs: 


                  Abnormal Lab Results - Last 24 Hours (Table)











  11/27/19 11/27/19 11/27/19 Range/Units





  00:50 00:50 00:50 


 


WBC   22.8 H   (3.8-10.6)  k/uL


 


Hgb     (11.4-16.0)  gm/dL


 


RDW   16.1 H   (11.5-15.5)  %


 


Neutrophils #   18.8 H   (1.3-7.7)  k/uL


 


Monocytes #   1.3 H   (0-1.0)  k/uL


 


PT     (9.0-12.0)  sec


 


INR     (<1.2)  


 


ABG pH     (7.35-7.45)  


 


ABG pO2     ()  mmHg


 


ABG HCO3     (21-25)  mmol/L


 


ABG Total CO2     (19-24)  mmol/L


 


ABG O2 Saturation     (94-97)  %


 


VBG pCO2  34 L    (37-51)  mmHg


 


VBG HCO3  17 L    (24-28)  mmol/L


 


Sodium    130 L  (137-145)  mmol/L


 


Potassium    5.8 H  (3.5-5.1)  mmol/L


 


Carbon Dioxide    19 L  (22-30)  mmol/L


 


BUN    54 H  (7-17)  mg/dL


 


Creatinine    1.44 H  (0.52-1.04)  mg/dL


 


POC Glucose (mg/dL)     (75-99)  mg/dL


 


Plasma Lactic Acid Yomi     (0.7-2.0)  mmol/L


 


Calcium    7.5 L  (8.4-10.2)  mg/dL


 


Total Bilirubin    2.9 H  (0.2-1.3)  mg/dL


 


AST    1799 H  (14-36)  U/L


 


ALT    189 H  (9-52)  U/L


 


Alkaline Phosphatase    296 H  ()  U/L


 


Creatine Kinase    204 H  ()  U/L


 


Troponin I     (0.000-0.034)  ng/mL


 


Total Protein    4.7 L  (6.3-8.2)  g/dL


 


Albumin    2.3 L  (3.5-5.0)  g/dL


 


Urine Appearance     (Clear)  


 


Urine Protein     (Negative)  


 


Urine Blood     (Negative)  


 


Urine Bilirubin     (Negative)  


 


Ur Leukocyte Esterase     (Negative)  


 


Urine RBC     (0-5)  /hpf


 


Urine WBC     (0-5)  /hpf


 


Urine WBC Clumps     (None)  /hpf


 


Amorphous Sediment     (None)  /hpf


 


Urine Bacteria     (None)  /hpf


 


Hyaline Casts     (0-2)  /lpf


 


Urine Mucus     (None)  /hpf


 


Urine Yeast (Budding)     (None)  /hpf














  11/27/19 11/27/19 11/27/19 Range/Units





  00:50 00:50 00:50 


 


WBC     (3.8-10.6)  k/uL


 


Hgb     (11.4-16.0)  gm/dL


 


RDW     (11.5-15.5)  %


 


Neutrophils #     (1.3-7.7)  k/uL


 


Monocytes #     (0-1.0)  k/uL


 


PT   12.3 H   (9.0-12.0)  sec


 


INR   1.2 H   (<1.2)  


 


ABG pH     (7.35-7.45)  


 


ABG pO2     ()  mmHg


 


ABG HCO3     (21-25)  mmol/L


 


ABG Total CO2     (19-24)  mmol/L


 


ABG O2 Saturation     (94-97)  %


 


VBG pCO2     (37-51)  mmHg


 


VBG HCO3     (24-28)  mmol/L


 


Sodium     (137-145)  mmol/L


 


Potassium     (3.5-5.1)  mmol/L


 


Carbon Dioxide     (22-30)  mmol/L


 


BUN     (7-17)  mg/dL


 


Creatinine     (0.52-1.04)  mg/dL


 


POC Glucose (mg/dL)     (75-99)  mg/dL


 


Plasma Lactic Acid Yomi  2.8 H*    (0.7-2.0)  mmol/L


 


Calcium     (8.4-10.2)  mg/dL


 


Total Bilirubin     (0.2-1.3)  mg/dL


 


AST     (14-36)  U/L


 


ALT     (9-52)  U/L


 


Alkaline Phosphatase     ()  U/L


 


Creatine Kinase     ()  U/L


 


Troponin I    0.048 H*  (0.000-0.034)  ng/mL


 


Total Protein     (6.3-8.2)  g/dL


 


Albumin     (3.5-5.0)  g/dL


 


Urine Appearance     (Clear)  


 


Urine Protein     (Negative)  


 


Urine Blood     (Negative)  


 


Urine Bilirubin     (Negative)  


 


Ur Leukocyte Esterase     (Negative)  


 


Urine RBC     (0-5)  /hpf


 


Urine WBC     (0-5)  /hpf


 


Urine WBC Clumps     (None)  /hpf


 


Amorphous Sediment     (None)  /hpf


 


Urine Bacteria     (None)  /hpf


 


Hyaline Casts     (0-2)  /lpf


 


Urine Mucus     (None)  /hpf


 


Urine Yeast (Budding)     (None)  /hpf














  11/27/19 11/27/19 11/27/19 Range/Units





  01:21 03:22 04:39 


 


WBC     (3.8-10.6)  k/uL


 


Hgb     (11.4-16.0)  gm/dL


 


RDW     (11.5-15.5)  %


 


Neutrophils #     (1.3-7.7)  k/uL


 


Monocytes #     (0-1.0)  k/uL


 


PT     (9.0-12.0)  sec


 


INR     (<1.2)  


 


ABG pH    7.24 L  (7.35-7.45)  


 


ABG pO2    301 H  ()  mmHg


 


ABG HCO3    17 L  (21-25)  mmol/L


 


ABG Total CO2    18 L  (19-24)  mmol/L


 


ABG O2 Saturation    99.1 H  (94-97)  %


 


VBG pCO2     (37-51)  mmHg


 


VBG HCO3     (24-28)  mmol/L


 


Sodium     (137-145)  mmol/L


 


Potassium     (3.5-5.1)  mmol/L


 


Carbon Dioxide     (22-30)  mmol/L


 


BUN     (7-17)  mg/dL


 


Creatinine     (0.52-1.04)  mg/dL


 


POC Glucose (mg/dL)   114 H   (75-99)  mg/dL


 


Plasma Lactic Acid Yomi     (0.7-2.0)  mmol/L


 


Calcium     (8.4-10.2)  mg/dL


 


Total Bilirubin     (0.2-1.3)  mg/dL


 


AST     (14-36)  U/L


 


ALT     (9-52)  U/L


 


Alkaline Phosphatase     ()  U/L


 


Creatine Kinase     ()  U/L


 


Troponin I     (0.000-0.034)  ng/mL


 


Total Protein     (6.3-8.2)  g/dL


 


Albumin     (3.5-5.0)  g/dL


 


Urine Appearance  Cloudy H    (Clear)  


 


Urine Protein  2+ H    (Negative)  


 


Urine Blood  Large H    (Negative)  


 


Urine Bilirubin  1+ H    (Negative)  


 


Ur Leukocyte Esterase  Small H    (Negative)  


 


Urine RBC  >182 H    (0-5)  /hpf


 


Urine WBC  33 H    (0-5)  /hpf


 


Urine WBC Clumps  Few H    (None)  /hpf


 


Amorphous Sediment  Rare H    (None)  /hpf


 


Urine Bacteria  Occasional H    (None)  /hpf


 


Hyaline Casts  101 H    (0-2)  /lpf


 


Urine Mucus  Many H    (None)  /hpf


 


Urine Yeast (Budding)  Occasional H    (None)  /hpf














  11/27/19 Range/Units





  04:49 


 


WBC  24.5 H  (3.8-10.6)  k/uL


 


Hgb  11.2 L  (11.4-16.0)  gm/dL


 


RDW  15.8 H  (11.5-15.5)  %


 


Neutrophils #  19.8 H  (1.3-7.7)  k/uL


 


Monocytes #  1.4 H  (0-1.0)  k/uL


 


PT   (9.0-12.0)  sec


 


INR   (<1.2)  


 


ABG pH   (7.35-7.45)  


 


ABG pO2   ()  mmHg


 


ABG HCO3   (21-25)  mmol/L


 


ABG Total CO2   (19-24)  mmol/L


 


ABG O2 Saturation   (94-97)  %


 


VBG pCO2   (37-51)  mmHg


 


VBG HCO3   (24-28)  mmol/L


 


Sodium   (137-145)  mmol/L


 


Potassium   (3.5-5.1)  mmol/L


 


Carbon Dioxide   (22-30)  mmol/L


 


BUN   (7-17)  mg/dL


 


Creatinine   (0.52-1.04)  mg/dL


 


POC Glucose (mg/dL)   (75-99)  mg/dL


 


Plasma Lactic Acid Yomi   (0.7-2.0)  mmol/L


 


Calcium   (8.4-10.2)  mg/dL


 


Total Bilirubin   (0.2-1.3)  mg/dL


 


AST   (14-36)  U/L


 


ALT   (9-52)  U/L


 


Alkaline Phosphatase   ()  U/L


 


Creatine Kinase   ()  U/L


 


Troponin I   (0.000-0.034)  ng/mL


 


Total Protein   (6.3-8.2)  g/dL


 


Albumin   (3.5-5.0)  g/dL


 


Urine Appearance   (Clear)  


 


Urine Protein   (Negative)  


 


Urine Blood   (Negative)  


 


Urine Bilirubin   (Negative)  


 


Ur Leukocyte Esterase   (Negative)  


 


Urine RBC   (0-5)  /hpf


 


Urine WBC   (0-5)  /hpf


 


Urine WBC Clumps   (None)  /hpf


 


Amorphous Sediment   (None)  /hpf


 


Urine Bacteria   (None)  /hpf


 


Hyaline Casts   (0-2)  /lpf


 


Urine Mucus   (None)  /hpf


 


Urine Yeast (Budding)   (None)  /hpf














Assessment and Plan


Plan: 





Septic shock secondary to UTI and HCAP


-Documented Penicillin allergy, though detailed chart review shows that patient 

received Ceftriaxone during prior hospitalization without incident. She also 

received Cefepime at outside facility prior to transfer.


-Will switch patient from Aztreonam to Cefepime 2 gm q8h


-C/w Vancomycin


-C/w Levophed infusion


-F/u urine and blood cultures


-C/w  cc/hr





Lethargy with hypoxic respiratory failure requiring mechanical ventilation


-Ventilator bundle


-Pulmonary consulted





Bilateral lower extremity edema, likely third-spacing due to severe protein 

calorie malnutrition 


-Echocardiogram from 11/19 revealed no systolic or diastolic dysfunction


-CXR from ED showing new infiltrate with no gross heart failure noted





Lactic acidosis


-Secondary to septic shock


-Monitor to resolution





Hyponatremia, likely due to poor oral intake


-Monitor for now


-C/w  cc/hr





ANABEL, likely due to septic shock


-S/p 3 L of NS


-C/w 150 cc/hr NS for now


-Monitor BMP daily





Abnormal LFTs, likely from metastatic lesions of the liver w/ progression


-Continue to monitor LFTs


-Avoid hepatotoxic agents





Severe protein-calorie malnutrition


-Will need dietician consult





Metastatic melanoma, on chemotherapy (Mekinist and Tafinlar)


-Consult oncology





Recent diagnosis of LLE DVT


-C/w home med: Eliquis





Hyperkalemia, resolved





HTN


-Hold antihypertensives in setting of septic shock





DVT prophylaxis


-Eliquis





The patient is admitted with an anticipated greater than 2 midnight stay for 

evaluation of septic shock


CODE STATUS: Full Code


Anticipated discharge date: 4-5 days


Anticipated discharge place: [facility]


A total of 45 minutes was spent on the care of this complex patient more than 

50% of the time was spent in counseling and care coordination.

## 2019-11-27 NOTE — P.CONS
History of Present Illness





- Reason for Consult


Consult date: 11/27/19


metastatic melanoma


Requesting physician: Eneida Mac





- Chief Complaint


lethargy, weakness





- History of Present Illness





Please refer to consultation documented 1120/2019 (7 days ago) for full history 

up to that point including malignancy.





Patient was just discharged late last week, she had not resumed any therapy for 

her metastatic melanoma, pending receipt of the dosed reduced drug and follow-up

in the office to reevaluate patient's trending liver enzymes.  Patient's 

daughter states that starting Saturday patient started to decline, not eating, 

drinking, unable to sit up independently, was not moving about, this progressed 

to include confusion and lethargy which is what brought her to the hospital.  On

admit patient was critical, she ended up being intubated, sedated, she is now on

vasopressors, nearly maxed out.





Review of Systems


ROS unobtainable: due to endotracheal tube





Past Medical History


Past Medical History: Cancer, Deep Vein Thrombosis (DVT), Hyperlipidemia, 

Hypertension, Pneumonia, Rheumatoid Arthritis (RA)


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 

spleen, kidney, back, lung , LLE DVT


History of Any Multi-Drug Resistant Organisms: None Reported


Past Surgical History: Bladder Surgery, Hernia Repair, Hysterectomy, Orthopedic 

Surgery


Additional Past Surgical History / Comment(s): tumor removal right breast, 3 

knee replacements, T& A, tropean eye surgery


Past Anesthesia/Blood Transfusion Reactions: No Reported Reaction


Past Psychological History: No Psychological Hx Reported


Smoking Status: Unknown if ever smoked


Past Alcohol Use History: None Reported


Past Drug Use History: None Reported





- Past Family History


  ** Father


Family Medical History: Cancer


Additional Family Medical History / Comment(s): lung cancer





  ** Mother


Family Medical History: Cancer


Additional Family Medical History / Comment(s): intestinal cancer





  ** Sister(s)


Family Medical History: Cancer


Additional Family Medical History / Comment(s): breast cancer





  ** Family


Additional Family Medical History / Comment(s): Denies history of cancer





Medications and Allergies


                                Home Medications











 Medication  Instructions  Recorded  Confirmed  Type


 


Meloxicam [Mobic] 7.5 mg PO BID 11/04/19 11/27/19 History


 


Nortriptyline [Pamelor] 10 mg PO HS 11/04/19 11/27/19 History


 


Nystatin-Triamcinolone Oint 1 applic TOPICAL BID PRN 11/04/19 11/27/19 History





[Mycolog 100,000-0.1 Unit/gm-%    





Oint]    


 


Omeprazole 20 mg PO DAILY 11/04/19 11/27/19 History


 


Prochlorperazine [Compazine] 10 mg PO Q6H PRN 11/04/19 11/27/19 History


 


Sertraline [Zoloft] 100 mg PO BID 11/04/19 11/27/19 History


 


Tafinlar 75mg Capsule 150 mg PO Q12H 11/04/19 11/27/19 History


 


Trametinib Dimethyl Sulfoxide 2 mg PO DAILY 11/04/19 11/27/19 History





[Mekinist]    


 


amLODIPine BESYLATE [Norvasc] 2.5 mg PO DAILY 11/04/19 11/27/19 History


 


Sennosides [Senokot] 8.6 mg PO BID 30 Days #60 tab 11/07/19 11/27/19 Rx


 


Apixaban [Eliquis] 5 mg PO BID 11/19/19 11/27/19 History


 


HYDROcodone/APAP 5-325MG [Norco 1 tab PO Q4HR PRN 11/19/19 11/27/19 History





5-325]    


 


Ipratropium-Albuterol Nebulize 3 ml INHALATION RT-QID #90 11/23/19 11/27/19 Rx





[Duoneb 0.5 mg-3 mg/3 ml Soln] ampul.neb   


 


Levofloxacin [Levaquin] 750 mg PO DAILY 4 Days #4 tab 11/23/19 11/27/19 Rx








                                    Allergies











Allergy/AdvReac Type Severity Reaction Status Date / Time


 


ciprofloxacin [From Cipro] Allergy  Hallucinati Verified 11/27/19 07:44





   ons  


 


Penicillins Allergy  Unknown Verified 11/27/19 07:44





   Childhood  














Physical Exam


Vitals: 


                                   Vital Signs











  Temp Pulse Resp BP Pulse Ox


 


 11/27/19 18:00   108 H  26 H   95


 


 11/27/19 17:45   109 H  26 H   94 L


 


 11/27/19 17:30   107 H  26 H   97


 


 11/27/19 17:15   112 H  26 H   99


 


 11/27/19 17:00   112 H  26 H   94 L


 


 11/27/19 16:45   113 H  26 H   95


 


 11/27/19 16:30   112 H  26 H   94 L


 


 11/27/19 16:15   112 H  26 H   93 L


 


 11/27/19 16:00  99.5 F  109 H  22   94 L


 


 11/27/19 15:30   110 H  22   92 L


 


 11/27/19 15:00   108 H  22   94 L


 


 11/27/19 14:30   106 H  21   96


 


 11/27/19 14:00   105 H  18   88 L


 


 11/27/19 13:30   109 H  19  100/34  96


 


 11/27/19 13:01   106 H  21   95


 


 11/27/19 12:30   104 H  21  100/34  96


 


 11/27/19 12:00  99.5 F  105 H  21  105/52  100


 


 11/27/19 11:30   105 H  24  


 


 11/27/19 11:00   103 H  26 H  105/52 


 


 11/27/19 10:30   106 H  21  105/52 


 


 11/27/19 10:00   101 H  23  116/42  100


 


 11/27/19 09:30   97  19  


 


 11/27/19 09:00   100  27 H  


 


 11/27/19 08:30   99  26 H  


 


 11/27/19 08:00  98.8 F  100  26 H  110/28  97


 


 11/27/19 07:30   97  26 H  110/28  97


 


 11/27/19 07:00   96  26 H   96


 


 11/27/19 06:30   96  26 H   92 L


 


 11/27/19 06:00   100  22   94 L


 


 11/27/19 05:30   101 H  22   95


 


 11/27/19 05:00   101 H  21   96


 


 11/27/19 04:30  98.6 F  107 H  16   98


 


 11/27/19 04:00   113 H  20  92/63  100


 


 11/27/19 03:30   113 H  22  85/56  94 L


 


 11/27/19 03:00   120 H  21  99/58  98


 


 11/27/19 02:30   112 H  16  102/56  97


 


 11/27/19 02:00   113 H  16  98/60  98


 


 11/27/19 01:50   114 H  16  103/26  99


 


 11/27/19 01:40   115 H  16  115/50  99


 


 11/27/19 01:30   112 H  16  101/38  99


 


 11/27/19 01:20   111 H  16  88/45  97


 


 11/27/19 01:10   112 H  14  103/52  97


 


 11/27/19 01:00   126 H  14   94 L


 


 11/27/19 00:53   106 H  18   95


 


 11/27/19 00:42  98.2 F  112 H  28 H  82/69  96








                                Intake and Output











 11/27/19 11/27/19 11/27/19





 06:59 14:59 22:59


 


Intake Total 6712.315 1661.884 1930.086


 


Output Total 140 117 15


 


Balance 9148.231 2197.884 1915.086


 


Intake:   


 


  IV 1550 4450 1800


 


    Aztreonam 2 gm In Sodium 100  





    Chloride 0.9% 100 ml @   





    100 mls/hr IVPB Q8H [facility]   





    Rx#:[id number]   


 


    Sodium Chloride 0.9% 1, 450 1200 800





    000 ml @ 150 mls/hr IV .   





    Q6H40M [facility] Rx#:[id number]   


 


    Sodium Chloride 0.9% 1, 1000 3000 1000





    000 ml @ 999 mls/hr IV .   





    Q1H1M [facility] Rx#:[id number]   


 


    Vancomycin 1,500 mg In  250 





    Sodium Chloride 0.9% 250   





    ml @ 125 mls/hr IVPB Q24H   





    [facility] Rx#:[id number]   


 


  Intake, IV Titration 86.467 280.884 130.086





  Amount   


 


    Cefepime 2 gm In Sodium  100 





    Chloride 0.9% 100 ml @   





    200 mls/hr IVPB Q12H [facility]   





    Rx#:[id number]   


 


    Heparin Sod,Pork in 0.45%   79.366





    NaCl 25,000 unit In 0.45   





    % NaCl 1 250ml.bag @ 12   





    UNITS/KG/HR 9.036 mls/hr   





    IV .Q24H [facility] Rx#:   





    [id number]   


 


    Norepinephrine 32 mg In 14.383 73.656 50.720





    Sodium Chloride 0.9% 218   





    ml @ 0.05 MCG/KG/MIN 1.   





    765 mls/hr IV .Q24H [facility]   





    Rx#:[id number]   


 


    Propofol 1,000 mg In 53.385 107.228 





    Empty Bag 1 bag @ Titrate   





    IV .Q0M [facility] Rx#:   





    [id number]   


 


    fentaNYL (PF) 1,000 mcg 18.699  





    In Sodium Chloride 0.9%   





    80 ml @ 1 MCG/KG/HR 7.53   





    mls/hr IV .[id number] [facility] Rx   





    #:[id number]   


 


Output:   


 


  Urine 140 117 15


 


Other:   


 


  Voiding Method Indwelling Catheter Indwelling Catheter Indwelling Catheter


 


  Weight 75.296 kg 84.9 kg 








                         ABP, PAP, CO, CI - Last 8 Hours











Arterial Blood Pressure        84/57


 


Arterial Blood Pressure        97/60


 


Arterial Blood Pressure        108/63


 


Arterial Blood Pressure        106/64


 


Arterial Blood Pressure        95/95


 


Arterial Blood Pressure        99/63


 


Arterial Blood Pressure        78/54


 


Arterial Blood Pressure        73/51


 


Arterial Blood Pressure        80/53


 


Arterial Blood Pressure        78/51


 


Arterial Blood Pressure        79/50


 


Arterial Blood Pressure        96/54


 


Arterial Blood Pressure        90/53


 


Arterial Blood Pressure        91/55


 


Arterial Blood Pressure        81/51


 


Arterial Blood Pressure        81/50


 


Arterial Blood Pressure        87/54


 


Arterial Blood Pressure        88/53


 


Arterial Blood Pressure        94/55


 


Arterial Blood Pressure        97/54

















Well-developed, generalized anasarca, ventilated and sedated, no distress as 

noted, extremities are cool to the touch, low blood pressure and tachycardia on 

the monitor





Results


CBC & Chem 7: 


                                 11/27/19 09:01





                                 11/27/19 04:49


Labs: 


                  Abnormal Lab Results - Last 24 Hours (Table)











  11/27/19 11/27/19 11/27/19 Range/Units





  00:50 00:50 00:50 


 


WBC   22.8 H   (3.8-10.6)  k/uL


 


Hgb     (11.4-16.0)  gm/dL


 


RDW   16.1 H   (11.5-15.5)  %


 


Neutrophils #   18.8 H   (1.3-7.7)  k/uL


 


Monocytes #   1.3 H   (0-1.0)  k/uL


 


PT     (9.0-12.0)  sec


 


INR     (<1.2)  


 


ABG pH     (7.35-7.45)  


 


ABG pCO2     (35-45)  mmHg


 


ABG pO2     ()  mmHg


 


ABG HCO3     (21-25)  mmol/L


 


ABG Total CO2     (19-24)  mmol/L


 


ABG O2 Saturation     (94-97)  %


 


VBG pCO2  34 L    (37-51)  mmHg


 


VBG HCO3  17 L    (24-28)  mmol/L


 


Sodium    130 L  (137-145)  mmol/L


 


Potassium    5.8 H  (3.5-5.1)  mmol/L


 


Carbon Dioxide    19 L  (22-30)  mmol/L


 


BUN    54 H  (7-17)  mg/dL


 


Creatinine    1.44 H  (0.52-1.04)  mg/dL


 


POC Glucose (mg/dL)     (75-99)  mg/dL


 


Plasma Lactic Acid Yomi     (0.7-2.0)  mmol/L


 


Calcium    7.5 L  (8.4-10.2)  mg/dL


 


Total Bilirubin    2.9 H  (0.2-1.3)  mg/dL


 


AST    1799 H  (14-36)  U/L


 


ALT    189 H  (9-52)  U/L


 


Alkaline Phosphatase    296 H  ()  U/L


 


Creatine Kinase    204 H  ()  U/L


 


Troponin I     (0.000-0.034)  ng/mL


 


Total Protein    4.7 L  (6.3-8.2)  g/dL


 


Albumin    2.3 L  (3.5-5.0)  g/dL


 


Urine Appearance     (Clear)  


 


Urine Protein     (Negative)  


 


Urine Blood     (Negative)  


 


Urine Bilirubin     (Negative)  


 


Ur Leukocyte Esterase     (Negative)  


 


Urine RBC     (0-5)  /hpf


 


Urine WBC     (0-5)  /hpf


 


Urine WBC Clumps     (None)  /hpf


 


Amorphous Sediment     (None)  /hpf


 


Urine Bacteria     (None)  /hpf


 


Hyaline Casts     (0-2)  /lpf


 


Urine Mucus     (None)  /hpf


 


Urine Yeast (Budding)     (None)  /hpf














  11/27/19 11/27/19 11/27/19 Range/Units





  00:50 00:50 00:50 


 


WBC     (3.8-10.6)  k/uL


 


Hgb     (11.4-16.0)  gm/dL


 


RDW     (11.5-15.5)  %


 


Neutrophils #     (1.3-7.7)  k/uL


 


Monocytes #     (0-1.0)  k/uL


 


PT   12.3 H   (9.0-12.0)  sec


 


INR   1.2 H   (<1.2)  


 


ABG pH     (7.35-7.45)  


 


ABG pCO2     (35-45)  mmHg


 


ABG pO2     ()  mmHg


 


ABG HCO3     (21-25)  mmol/L


 


ABG Total CO2     (19-24)  mmol/L


 


ABG O2 Saturation     (94-97)  %


 


VBG pCO2     (37-51)  mmHg


 


VBG HCO3     (24-28)  mmol/L


 


Sodium     (137-145)  mmol/L


 


Potassium     (3.5-5.1)  mmol/L


 


Carbon Dioxide     (22-30)  mmol/L


 


BUN     (7-17)  mg/dL


 


Creatinine     (0.52-1.04)  mg/dL


 


POC Glucose (mg/dL)     (75-99)  mg/dL


 


Plasma Lactic Acid Yomi  2.8 H*    (0.7-2.0)  mmol/L


 


Calcium     (8.4-10.2)  mg/dL


 


Total Bilirubin     (0.2-1.3)  mg/dL


 


AST     (14-36)  U/L


 


ALT     (9-52)  U/L


 


Alkaline Phosphatase     ()  U/L


 


Creatine Kinase     ()  U/L


 


Troponin I    0.048 H*  (0.000-0.034)  ng/mL


 


Total Protein     (6.3-8.2)  g/dL


 


Albumin     (3.5-5.0)  g/dL


 


Urine Appearance     (Clear)  


 


Urine Protein     (Negative)  


 


Urine Blood     (Negative)  


 


Urine Bilirubin     (Negative)  


 


Ur Leukocyte Esterase     (Negative)  


 


Urine RBC     (0-5)  /hpf


 


Urine WBC     (0-5)  /hpf


 


Urine WBC Clumps     (None)  /hpf


 


Amorphous Sediment     (None)  /hpf


 


Urine Bacteria     (None)  /hpf


 


Hyaline Casts     (0-2)  /lpf


 


Urine Mucus     (None)  /hpf


 


Urine Yeast (Budding)     (None)  /hpf














  11/27/19 11/27/19 11/27/19 Range/Units





  01:21 03:22 04:39 


 


WBC     (3.8-10.6)  k/uL


 


Hgb     (11.4-16.0)  gm/dL


 


RDW     (11.5-15.5)  %


 


Neutrophils #     (1.3-7.7)  k/uL


 


Monocytes #     (0-1.0)  k/uL


 


PT     (9.0-12.0)  sec


 


INR     (<1.2)  


 


ABG pH    7.24 L  (7.35-7.45)  


 


ABG pCO2     (35-45)  mmHg


 


ABG pO2    301 H  ()  mmHg


 


ABG HCO3    17 L  (21-25)  mmol/L


 


ABG Total CO2    18 L  (19-24)  mmol/L


 


ABG O2 Saturation    99.1 H  (94-97)  %


 


VBG pCO2     (37-51)  mmHg


 


VBG HCO3     (24-28)  mmol/L


 


Sodium     (137-145)  mmol/L


 


Potassium     (3.5-5.1)  mmol/L


 


Carbon Dioxide     (22-30)  mmol/L


 


BUN     (7-17)  mg/dL


 


Creatinine     (0.52-1.04)  mg/dL


 


POC Glucose (mg/dL)   114 H   (75-99)  mg/dL


 


Plasma Lactic Acid Yoim     (0.7-2.0)  mmol/L


 


Calcium     (8.4-10.2)  mg/dL


 


Total Bilirubin     (0.2-1.3)  mg/dL


 


AST     (14-36)  U/L


 


ALT     (9-52)  U/L


 


Alkaline Phosphatase     ()  U/L


 


Creatine Kinase     ()  U/L


 


Troponin I     (0.000-0.034)  ng/mL


 


Total Protein     (6.3-8.2)  g/dL


 


Albumin     (3.5-5.0)  g/dL


 


Urine Appearance  Cloudy H    (Clear)  


 


Urine Protein  2+ H    (Negative)  


 


Urine Blood  Large H    (Negative)  


 


Urine Bilirubin  1+ H    (Negative)  


 


Ur Leukocyte Esterase  Small H    (Negative)  


 


Urine RBC  >182 H    (0-5)  /hpf


 


Urine WBC  33 H    (0-5)  /hpf


 


Urine WBC Clumps  Few H    (None)  /hpf


 


Amorphous Sediment  Rare H    (None)  /hpf


 


Urine Bacteria  Occasional H    (None)  /hpf


 


Hyaline Casts  101 H    (0-2)  /lpf


 


Urine Mucus  Many H    (None)  /hpf


 


Urine Yeast (Budding)  Occasional H    (None)  /hpf














  11/27/19 11/27/19 11/27/19 Range/Units





  04:49 04:49 05:30 


 


WBC  24.5 H    (3.8-10.6)  k/uL


 


Hgb  11.2 L    (11.4-16.0)  gm/dL


 


RDW  15.8 H    (11.5-15.5)  %


 


Neutrophils #  19.8 H    (1.3-7.7)  k/uL


 


Monocytes #  1.4 H    (0-1.0)  k/uL


 


PT     (9.0-12.0)  sec


 


INR     (<1.2)  


 


ABG pH     (7.35-7.45)  


 


ABG pCO2     (35-45)  mmHg


 


ABG pO2     ()  mmHg


 


ABG HCO3     (21-25)  mmol/L


 


ABG Total CO2     (19-24)  mmol/L


 


ABG O2 Saturation     (94-97)  %


 


VBG pCO2     (37-51)  mmHg


 


VBG HCO3     (24-28)  mmol/L


 


Sodium   131 L   (137-145)  mmol/L


 


Potassium     (3.5-5.1)  mmol/L


 


Carbon Dioxide   17 L   (22-30)  mmol/L


 


BUN   50 H   (7-17)  mg/dL


 


Creatinine   1.25 H   (0.52-1.04)  mg/dL


 


POC Glucose (mg/dL)     (75-99)  mg/dL


 


Plasma Lactic Acid Yomi    2.3 H*  (0.7-2.0)  mmol/L


 


Calcium   6.5 L   (8.4-10.2)  mg/dL


 


Total Bilirubin   2.8 H   (0.2-1.3)  mg/dL


 


AST   1491 H   (14-36)  U/L


 


ALT   154 H   (9-52)  U/L


 


Alkaline Phosphatase   230 H   ()  U/L


 


Creatine Kinase     ()  U/L


 


Troponin I     (0.000-0.034)  ng/mL


 


Total Protein   3.6 L   (6.3-8.2)  g/dL


 


Albumin   1.7 L   (3.5-5.0)  g/dL


 


Urine Appearance     (Clear)  


 


Urine Protein     (Negative)  


 


Urine Blood     (Negative)  


 


Urine Bilirubin     (Negative)  


 


Ur Leukocyte Esterase     (Negative)  


 


Urine RBC     (0-5)  /hpf


 


Urine WBC     (0-5)  /hpf


 


Urine WBC Clumps     (None)  /hpf


 


Amorphous Sediment     (None)  /hpf


 


Urine Bacteria     (None)  /hpf


 


Hyaline Casts     (0-2)  /lpf


 


Urine Mucus     (None)  /hpf


 


Urine Yeast (Budding)     (None)  /hpf














  11/27/19 11/27/19 11/27/19 Range/Units





  05:30 09:01 09:01 


 


WBC   24.4 H   (3.8-10.6)  k/uL


 


Hgb     (11.4-16.0)  gm/dL


 


RDW   15.9 H   (11.5-15.5)  %


 


Neutrophils #   20.0 H   (1.3-7.7)  k/uL


 


Monocytes #     (0-1.0)  k/uL


 


PT    12.8 H  (9.0-12.0)  sec


 


INR    1.2 H  (<1.2)  


 


ABG pH     (7.35-7.45)  


 


ABG pCO2     (35-45)  mmHg


 


ABG pO2     ()  mmHg


 


ABG HCO3     (21-25)  mmol/L


 


ABG Total CO2     (19-24)  mmol/L


 


ABG O2 Saturation     (94-97)  %


 


VBG pCO2     (37-51)  mmHg


 


VBG HCO3     (24-28)  mmol/L


 


Sodium     (137-145)  mmol/L


 


Potassium     (3.5-5.1)  mmol/L


 


Carbon Dioxide     (22-30)  mmol/L


 


BUN     (7-17)  mg/dL


 


Creatinine     (0.52-1.04)  mg/dL


 


POC Glucose (mg/dL)     (75-99)  mg/dL


 


Plasma Lactic Acid Yomi     (0.7-2.0)  mmol/L


 


Calcium     (8.4-10.2)  mg/dL


 


Total Bilirubin     (0.2-1.3)  mg/dL


 


AST     (14-36)  U/L


 


ALT     (9-52)  U/L


 


Alkaline Phosphatase     ()  U/L


 


Creatine Kinase     ()  U/L


 


Troponin I  0.046 H*    (0.000-0.034)  ng/mL


 


Total Protein     (6.3-8.2)  g/dL


 


Albumin     (3.5-5.0)  g/dL


 


Urine Appearance     (Clear)  


 


Urine Protein     (Negative)  


 


Urine Blood     (Negative)  


 


Urine Bilirubin     (Negative)  


 


Ur Leukocyte Esterase     (Negative)  


 


Urine RBC     (0-5)  /hpf


 


Urine WBC     (0-5)  /hpf


 


Urine WBC Clumps     (None)  /hpf


 


Amorphous Sediment     (None)  /hpf


 


Urine Bacteria     (None)  /hpf


 


Hyaline Casts     (0-2)  /lpf


 


Urine Mucus     (None)  /hpf


 


Urine Yeast (Budding)     (None)  /hpf














  11/27/19 11/27/19 11/27/19 Range/Units





  10:16 13:01 15:55 


 


WBC     (3.8-10.6)  k/uL


 


Hgb     (11.4-16.0)  gm/dL


 


RDW     (11.5-15.5)  %


 


Neutrophils #     (1.3-7.7)  k/uL


 


Monocytes #     (0-1.0)  k/uL


 


PT     (9.0-12.0)  sec


 


INR     (<1.2)  


 


ABG pH    7.23 L  (7.35-7.45)  


 


ABG pCO2    22 L  (35-45)  mmHg


 


ABG pO2     ()  mmHg


 


ABG HCO3    9 L*  (21-25)  mmol/L


 


ABG Total CO2    10 L  (19-24)  mmol/L


 


ABG O2 Saturation     (94-97)  %


 


VBG pCO2     (37-51)  mmHg


 


VBG HCO3     (24-28)  mmol/L


 


Sodium     (137-145)  mmol/L


 


Potassium     (3.5-5.1)  mmol/L


 


Carbon Dioxide     (22-30)  mmol/L


 


BUN     (7-17)  mg/dL


 


Creatinine     (0.52-1.04)  mg/dL


 


POC Glucose (mg/dL)   73 L   (75-99)  mg/dL


 


Plasma Lactic Acid Yomi  2.1 H*    (0.7-2.0)  mmol/L


 


Calcium     (8.4-10.2)  mg/dL


 


Total Bilirubin     (0.2-1.3)  mg/dL


 


AST     (14-36)  U/L


 


ALT     (9-52)  U/L


 


Alkaline Phosphatase     ()  U/L


 


Creatine Kinase     ()  U/L


 


Troponin I     (0.000-0.034)  ng/mL


 


Total Protein     (6.3-8.2)  g/dL


 


Albumin     (3.5-5.0)  g/dL


 


Urine Appearance     (Clear)  


 


Urine Protein     (Negative)  


 


Urine Blood     (Negative)  


 


Urine Bilirubin     (Negative)  


 


Ur Leukocyte Esterase     (Negative)  


 


Urine RBC     (0-5)  /hpf


 


Urine WBC     (0-5)  /hpf


 


Urine WBC Clumps     (None)  /hpf


 


Amorphous Sediment     (None)  /hpf


 


Urine Bacteria     (None)  /hpf


 


Hyaline Casts     (0-2)  /lpf


 


Urine Mucus     (None)  /hpf


 


Urine Yeast (Budding)     (None)  /hpf








                      Microbiology - Last 24 Hours (Table)











 11/27/19 01:21 Urine Culture - Preliminary





 Urine,Voided 














Assessment and Plan


(1) LFT elevation


Current Visit: Yes   Status: Acute   Priority: High   Code(s): R94.5 - ABNORMAL 

RESULTS OF LIVER FUNCTION STUDIES   SNOMED Code(s): 141458211


   





(2) Metastatic melanoma


Current Visit: Yes   Status: Chronic   Priority: High   Code(s): C79.9 - 

SECONDARY MALIGNANT NEOPLASM OF UNSPECIFIED SITE   SNOMED Code(s): 424025559


   


Plan: 





Case was discussed with the Attending Physician.  The critical care team and 

Attending are going to try to treat any reversible conditions (i.e. infection, 

adrenal insufficiency).  If treatment of the same does not improve patient's 

condition that unfortunately, the most likely scenario is that pt is failing due

to metastatic disease progression.  This was discussed with the family. They 

verbalized understanding, all questions answered to their satisfaction.  


CODE STATUS was changed to No Code with instructions.  


When I talked with the family they seem very realistic and understanding how 

critical patient's case is.  Continue care as directed by Critical Care and 

Internal Medicine.  Family is already anticipating transition to comfort 

measures only.

## 2019-11-27 NOTE — XR
EXAMINATION TYPE: XR chest 1V portable

 

DATE OF EXAM: 11/27/2019

 

CLINICAL HISTORY: Difficulty breathing progress study.  History of metastatic melanoma.

 

TECHNIQUE: Single AP portable upright view of the chest is obtained.

 

COMPARISON: Chest x-ray from earlier today and older studies. CTA chest November 4, 2019.

 

FINDINGS:  An endotracheal tube, oral gastric tube, left internal jugular central venous catheter, an
d right internal jugular Mediport catheter are all stable in appearance. Background chronic parenchym
a change with scattered bilateral nodules most prominent lower lungs. No new suspicious focal airspac
e opacity or pneumothorax. Suspect stable small to tiny left pleural effusion. Hilar prominence corre
lates to underlying pulmonary artery hypertension on CT. Cardiac silhouette size is stable and upper 
limits of normal. Left axillary surgical clips redemonstrated. Osseous structures intact.

 

IMPRESSION:  Overall stable findings,  chronic parenchymal changes and metastatic malignancy. Probabl
e stable small to tiny left pleural effusion. No new infiltrate.

## 2019-11-29 NOTE — P.DS
Providers


Date of admission: 


19 02:22





Expected date of discharge: 19


Attending physician: 


Eneida Mac MD





Consults: 





                                        





19 02:22


Consult Physician Stat 


   Consulting Provider: Rama Greenberg


   Consult Reason/Comments: ICU


   Do you want consulting provider notified?: Already Contacted





19 06:16


Consult Physician Routine 


   Consulting Provider: Connor Arellano


   Consult Reason/Comments: Metastatic melanoma


   Do you want consulting provider notified?: Yes











Primary care physician: 


Brian Simpson





Hospital Course: 


Discharge Diagnosis:


Patient  





Multiorgan failure


Septic shock


Probable UTI


Secondary adrenal insufficency


Stage IV Metastatic Melanoma


ANABEL due to ATN


Type II MI


Acute Hepatitis, probable shock liver


Lactic Acidosis


TOxic metabolic encephalopathy


Hyponatremia


Hyperkalemia 


Severe Protein Caloria Malnutrition 


Acute respiratory failure-hypoxic 


Recent LLL PNA


HTN


HLT


Hx of L LE DVT














Hospital Course: 


Patient is a 74-year-old  female with a past medical history of 

metastatic melanoma with metastases to the lungs, liver, and adrenal gland 

(following with Dr. Arellano for 18 months had been unable to tolerate her most 

recent treatments), recent left lower extremity DVT, hypertension, and 

dyslipidemia who presented to the emergency department secondary to lethargy and

confusion.  Patient had been discharged from [facility] on  after a

five-day hospitalization for pneumonia and urinary tract infection.  Patient was

discharged home on Levaquin. Initially she had done better per family, but 2 

days prior to admission she became quite weak. She presented to an outside 

facility and was noted to be profoundly hypotensive.  She was given IV fluids 

and Lasix for treatment of pulmonary edema.  When she arrived to the ER here she

was very lethargic, hypotensive, and tachycardic.  She was subsequently 

intubated for airway protection.  She was found to have a white blood cell count

24,000, hyponatremia, hyperkalemia, HPI, and a chest x-ray showing pulmonary 

interstitial edema with infiltrate.  She was also found to have an elevated 

lactic acid at 2.8 and urinalysis demonstrated large amounts of blood but no 

definitive infection.  She was started on broad-spectrum antibiotics in the form

of vancomycin and cefepime.  She received a total of 4 L of IV fluids and 

ultimately required norepinephrine to support her blood pressure.  She was 

placed on propofol and admitted to the ICU. She was also noted to have acute 

hepatitis though to be related to shock liver vs her known malignancy with mets 

to the liver. 





She was critical care. Despite max doses of levophed she was still hypotensive 

and anuric. She was given additional fluids, stress dose steroids, and ultimatel

y vasopressin was added. She continued to be hypotension. I had a Goals of care 

discussion with the family. We subsequently continued aggressive care, but she 

was mad a DNR should her heart stop. 





She ultimately subbcumb to her illness on the evening of  at 2124. 





A total of 25 minutes of time were spent preparing this complex discharge 

summary .





Patient Condition at Discharge: Undetermined





Plan - Discharge Summary


Discharge Rx Participant: No


New Discharge Prescriptions: 


No Action


   Prochlorperazine [Compazine] 10 mg PO Q6H PRN


     PRN Reason: Nausea


   Sertraline [Zoloft] 100 mg PO BID


   Omeprazole 20 mg PO DAILY


   Nystatin-Triamcinolone Oint [Mycolog 100,000-0.1 Unit/gm-% Oint] 1 applic 

TOPICAL BID PRN


     PRN Reason: Skin Irritation


   amLODIPine BESYLATE [Norvasc] 2.5 mg PO DAILY


   Nortriptyline [Pamelor] 10 mg PO HS


   Meloxicam [Mobic] 7.5 mg PO BID


   Trametinib Dimethyl Sulfoxide [Mekinist] 2 mg PO DAILY


   Tafinlar 75mg Capsule 150 mg PO Q12H


   Sennosides [Senokot] 8.6 mg PO BID 30 Days #60 tab


   HYDROcodone/APAP 5-325MG [Norco 5-325] 1 tab PO Q4HR PRN


     PRN Reason: Pain


   Apixaban [Eliquis] 5 mg PO BID


   Ipratropium-Albuterol Nebulize [Duoneb 0.5 mg-3 mg/3 ml Soln] 3 ml INHALATION

RT-QID #90 ampul.neb


   Levofloxacin [Levaquin] 750 mg PO DAILY 4 Days #4 tab


Discharge Medication List





Meloxicam [Mobic] 7.5 mg PO BID 19 [History]


Nortriptyline [Pamelor] 10 mg PO HS 19 [History]


Nystatin-Triamcinolone Oint [Mycolog 100,000-0.1 Unit/gm-% Oint] 1 applic 

TOPICAL BID PRN 19 [History]


Omeprazole 20 mg PO DAILY 19 [History]


Prochlorperazine [Compazine] 10 mg PO Q6H PRN 19 [History]


Sertraline [Zoloft] 100 mg PO BID 19 [History]


Tafinlar 75mg Capsule 150 mg PO Q12H 19 [History]


Trametinib Dimethyl Sulfoxide [Mekinist] 2 mg PO DAILY 19 [History]


amLODIPine BESYLATE [Norvasc] 2.5 mg PO DAILY 19 [History]


Sennosides [Senokot] 8.6 mg PO BID 30 Days #60 tab 19 [Rx]


Apixaban [Eliquis] 5 mg PO BID 19 [History]


HYDROcodone/APAP 5-325MG [Norco 5-325] 1 tab PO Q4HR PRN 19 [History]


Ipratropium-Albuterol Nebulize [Duoneb 0.5 mg-3 mg/3 ml Soln] 3 ml INHALATION 

RT-QID #90 ampul.neb 19 [Rx]


Levofloxacin [Levaquin] 750 mg PO DAILY 4 Days #4 tab 19 [Rx]








Follow up Appointment(s)/Referral(s): 


Connor Arellano MD [STAFF PHYSICIAN] - 19 3:45 pm


Brian Simpson MD [Primary Care Provider] - 1-2 days


Discharge Disposition: 





- Preliminary Cause of Death


Preliminary Cause of Death: multiorgan failure